# Patient Record
Sex: FEMALE | Race: WHITE | NOT HISPANIC OR LATINO | Employment: FULL TIME | URBAN - METROPOLITAN AREA
[De-identification: names, ages, dates, MRNs, and addresses within clinical notes are randomized per-mention and may not be internally consistent; named-entity substitution may affect disease eponyms.]

---

## 2025-07-06 ENCOUNTER — APPOINTMENT (OUTPATIENT)
Dept: RADIOLOGY | Facility: HOSPITAL | Age: 70
DRG: 435 | End: 2025-07-06
Payer: COMMERCIAL

## 2025-07-06 ENCOUNTER — HOSPITAL ENCOUNTER (INPATIENT)
Facility: HOSPITAL | Age: 70
LOS: 4 days | Discharge: HOME | DRG: 435 | End: 2025-07-10
Attending: STUDENT IN AN ORGANIZED HEALTH CARE EDUCATION/TRAINING PROGRAM | Admitting: INTERNAL MEDICINE
Payer: COMMERCIAL

## 2025-07-06 DIAGNOSIS — I81 PORTAL VEIN THROMBOSIS: Primary | ICD-10-CM

## 2025-07-06 LAB
ALBUMIN SERPL BCP-MCNC: 3.4 G/DL (ref 3.4–5)
ALP SERPL-CCNC: 171 U/L (ref 33–136)
ALT SERPL W P-5'-P-CCNC: 26 U/L (ref 7–45)
ANION GAP BLDV CALCULATED.4IONS-SCNC: 13 MMOL/L (ref 10–25)
ANION GAP SERPL CALC-SCNC: 12 MMOL/L (ref 10–20)
APPEARANCE UR: CLEAR
APTT PPP: 31 SECONDS (ref 26–36)
AST SERPL W P-5'-P-CCNC: 55 U/L (ref 9–39)
BASE EXCESS BLDV CALC-SCNC: -1.8 MMOL/L (ref -2–3)
BASOPHILS # BLD AUTO: 0.02 X10*3/UL (ref 0–0.1)
BASOPHILS NFR BLD AUTO: 0.2 %
BILIRUB SERPL-MCNC: 1.8 MG/DL (ref 0–1.2)
BILIRUB UR STRIP.AUTO-MCNC: NEGATIVE MG/DL
BNP SERPL-MCNC: 55 PG/ML (ref 0–99)
BODY TEMPERATURE: 37 DEGREES CELSIUS
BUN SERPL-MCNC: 12 MG/DL (ref 6–23)
CA-I BLDV-SCNC: 1.15 MMOL/L (ref 1.1–1.33)
CALCIUM SERPL-MCNC: 8.6 MG/DL (ref 8.6–10.3)
CARDIAC TROPONIN I PNL SERPL HS: 5 NG/L (ref 0–13)
CARDIAC TROPONIN I PNL SERPL HS: 5 NG/L (ref 0–13)
CHLORIDE BLDV-SCNC: 97 MMOL/L (ref 98–107)
CHLORIDE SERPL-SCNC: 99 MMOL/L (ref 98–107)
CO2 SERPL-SCNC: 22 MMOL/L (ref 21–32)
COLOR UR: YELLOW
CREAT SERPL-MCNC: 0.52 MG/DL (ref 0.5–1.05)
EGFRCR SERPLBLD CKD-EPI 2021: >90 ML/MIN/1.73M*2
EOSINOPHIL # BLD AUTO: 0.02 X10*3/UL (ref 0–0.7)
EOSINOPHIL NFR BLD AUTO: 0.2 %
ERYTHROCYTE [DISTWIDTH] IN BLOOD BY AUTOMATED COUNT: 15.9 % (ref 11.5–14.5)
GLUCOSE BLDV-MCNC: 100 MG/DL (ref 74–99)
GLUCOSE SERPL-MCNC: 97 MG/DL (ref 74–99)
GLUCOSE UR STRIP.AUTO-MCNC: NORMAL MG/DL
HCO3 BLDV-SCNC: 22.2 MMOL/L (ref 22–26)
HCT VFR BLD AUTO: 42.4 % (ref 36–46)
HCT VFR BLD EST: 44 % (ref 36–46)
HGB BLD-MCNC: 13.6 G/DL (ref 12–16)
HGB BLDV-MCNC: 14.5 G/DL (ref 12–16)
IMM GRANULOCYTES # BLD AUTO: 0.06 X10*3/UL (ref 0–0.7)
IMM GRANULOCYTES NFR BLD AUTO: 0.6 % (ref 0–0.9)
INHALED O2 CONCENTRATION: 21 %
INR PPP: 1.3 (ref 0.9–1.1)
IRON SATN MFR SERPL: 28 % (ref 25–45)
IRON SERPL-MCNC: 62 UG/DL (ref 35–150)
KETONES UR STRIP.AUTO-MCNC: ABNORMAL MG/DL
LACTATE BLDV-SCNC: 1.9 MMOL/L (ref 0.4–2)
LEUKOCYTE ESTERASE UR QL STRIP.AUTO: NEGATIVE
LYMPHOCYTES # BLD AUTO: 0.97 X10*3/UL (ref 1.2–4.8)
LYMPHOCYTES NFR BLD AUTO: 9.6 %
MAGNESIUM SERPL-MCNC: 1.88 MG/DL (ref 1.6–2.4)
MCH RBC QN AUTO: 31.1 PG (ref 26–34)
MCHC RBC AUTO-ENTMCNC: 32.1 G/DL (ref 32–36)
MCV RBC AUTO: 97 FL (ref 80–100)
MONOCYTES # BLD AUTO: 0.87 X10*3/UL (ref 0.1–1)
MONOCYTES NFR BLD AUTO: 8.6 %
NEUTROPHILS # BLD AUTO: 8.14 X10*3/UL (ref 1.2–7.7)
NEUTROPHILS NFR BLD AUTO: 80.8 %
NITRITE UR QL STRIP.AUTO: NEGATIVE
NRBC BLD-RTO: 0 /100 WBCS (ref 0–0)
OXYHGB MFR BLDV: 53.4 % (ref 45–75)
PCO2 BLDV: 35 MM HG (ref 41–51)
PH BLDV: 7.41 PH (ref 7.33–7.43)
PH UR STRIP.AUTO: 6.5 [PH]
PLATELET # BLD AUTO: 121 X10*3/UL (ref 150–450)
PO2 BLDV: 33 MM HG (ref 35–45)
POTASSIUM BLDV-SCNC: 4.1 MMOL/L (ref 3.5–5.3)
POTASSIUM SERPL-SCNC: 4 MMOL/L (ref 3.5–5.3)
PROT SERPL-MCNC: 7.6 G/DL (ref 6.4–8.2)
PROT UR STRIP.AUTO-MCNC: ABNORMAL MG/DL
PROTHROMBIN TIME: 14.5 SECONDS (ref 9.8–12.4)
RBC # BLD AUTO: 4.38 X10*6/UL (ref 4–5.2)
RBC # UR STRIP.AUTO: NEGATIVE MG/DL
RBC #/AREA URNS AUTO: NORMAL /HPF
SAO2 % BLDV: 55 % (ref 45–75)
SODIUM BLDV-SCNC: 128 MMOL/L (ref 136–145)
SODIUM SERPL-SCNC: 129 MMOL/L (ref 136–145)
SP GR UR STRIP.AUTO: >1.05
SQUAMOUS #/AREA URNS AUTO: NORMAL /HPF
TIBC SERPL-MCNC: 220 UG/DL (ref 240–445)
UFH PPP CHRO-ACNC: 0.8 IU/ML (ref ?–1.1)
UIBC SERPL-MCNC: 158 UG/DL (ref 110–370)
UROBILINOGEN UR STRIP.AUTO-MCNC: NORMAL MG/DL
WBC # BLD AUTO: 10.1 X10*3/UL (ref 4.4–11.3)
WBC #/AREA URNS AUTO: NORMAL /HPF

## 2025-07-06 PROCEDURE — 85520 HEPARIN ASSAY: CPT

## 2025-07-06 PROCEDURE — 83735 ASSAY OF MAGNESIUM: CPT

## 2025-07-06 PROCEDURE — 82105 ALPHA-FETOPROTEIN SERUM: CPT | Mod: PARLAB | Performed by: PHYSICIAN ASSISTANT

## 2025-07-06 PROCEDURE — 2550000001 HC RX 255 CONTRASTS: Performed by: STUDENT IN AN ORGANIZED HEALTH CARE EDUCATION/TRAINING PROGRAM

## 2025-07-06 PROCEDURE — 1200000002 HC GENERAL ROOM WITH TELEMETRY DAILY

## 2025-07-06 PROCEDURE — 84466 ASSAY OF TRANSFERRIN: CPT | Mod: PARLAB

## 2025-07-06 PROCEDURE — 83880 ASSAY OF NATRIURETIC PEPTIDE: CPT

## 2025-07-06 PROCEDURE — 96374 THER/PROPH/DIAG INJ IV PUSH: CPT

## 2025-07-06 PROCEDURE — 36415 COLL VENOUS BLD VENIPUNCTURE: CPT | Performed by: STUDENT IN AN ORGANIZED HEALTH CARE EDUCATION/TRAINING PROGRAM

## 2025-07-06 PROCEDURE — 99223 1ST HOSP IP/OBS HIGH 75: CPT

## 2025-07-06 PROCEDURE — 93971 EXTREMITY STUDY: CPT | Performed by: RADIOLOGY

## 2025-07-06 PROCEDURE — 81003 URINALYSIS AUTO W/O SCOPE: CPT

## 2025-07-06 PROCEDURE — 99285 EMERGENCY DEPT VISIT HI MDM: CPT | Mod: 25 | Performed by: STUDENT IN AN ORGANIZED HEALTH CARE EDUCATION/TRAINING PROGRAM

## 2025-07-06 PROCEDURE — 93971 EXTREMITY STUDY: CPT

## 2025-07-06 PROCEDURE — 82728 ASSAY OF FERRITIN: CPT | Mod: PARLAB

## 2025-07-06 PROCEDURE — 74177 CT ABD & PELVIS W/CONTRAST: CPT

## 2025-07-06 PROCEDURE — 84484 ASSAY OF TROPONIN QUANT: CPT

## 2025-07-06 PROCEDURE — 83540 ASSAY OF IRON: CPT

## 2025-07-06 PROCEDURE — 82607 VITAMIN B-12: CPT | Mod: PARLAB

## 2025-07-06 PROCEDURE — 84132 ASSAY OF SERUM POTASSIUM: CPT

## 2025-07-06 PROCEDURE — 82746 ASSAY OF FOLIC ACID SERUM: CPT | Mod: PARLAB

## 2025-07-06 PROCEDURE — 85610 PROTHROMBIN TIME: CPT

## 2025-07-06 PROCEDURE — 2500000004 HC RX 250 GENERAL PHARMACY W/ HCPCS (ALT 636 FOR OP/ED): Mod: JZ

## 2025-07-06 PROCEDURE — 36415 COLL VENOUS BLD VENIPUNCTURE: CPT

## 2025-07-06 PROCEDURE — 71275 CT ANGIOGRAPHY CHEST: CPT

## 2025-07-06 PROCEDURE — 80053 COMPREHEN METABOLIC PANEL: CPT

## 2025-07-06 PROCEDURE — 85025 COMPLETE CBC W/AUTO DIFF WBC: CPT

## 2025-07-06 RX ORDER — HYDROMORPHONE HYDROCHLORIDE 0.2 MG/ML
0.2 INJECTION INTRAMUSCULAR; INTRAVENOUS; SUBCUTANEOUS EVERY 4 HOURS PRN
Status: DISCONTINUED | OUTPATIENT
Start: 2025-07-06 | End: 2025-07-10 | Stop reason: HOSPADM

## 2025-07-06 RX ORDER — POLYETHYLENE GLYCOL 3350 17 G/17G
17 POWDER, FOR SOLUTION ORAL DAILY
Status: DISCONTINUED | OUTPATIENT
Start: 2025-07-07 | End: 2025-07-10 | Stop reason: HOSPADM

## 2025-07-06 RX ORDER — SODIUM CHLORIDE 9 MG/ML
100 INJECTION, SOLUTION INTRAVENOUS CONTINUOUS
Status: DISCONTINUED | OUTPATIENT
Start: 2025-07-06 | End: 2025-07-07

## 2025-07-06 RX ORDER — LISINOPRIL 20 MG/1
20 TABLET ORAL DAILY
Status: DISCONTINUED | OUTPATIENT
Start: 2025-07-07 | End: 2025-07-10 | Stop reason: HOSPADM

## 2025-07-06 RX ORDER — ONDANSETRON HYDROCHLORIDE 2 MG/ML
4 INJECTION, SOLUTION INTRAVENOUS EVERY 8 HOURS PRN
Status: DISCONTINUED | OUTPATIENT
Start: 2025-07-06 | End: 2025-07-08

## 2025-07-06 RX ORDER — FAMOTIDINE 20 MG/1
20 TABLET, FILM COATED ORAL EVERY MORNING
COMMUNITY

## 2025-07-06 RX ORDER — HEPARIN SODIUM 10000 [USP'U]/100ML
0-4500 INJECTION, SOLUTION INTRAVENOUS CONTINUOUS
Status: DISCONTINUED | OUTPATIENT
Start: 2025-07-06 | End: 2025-07-10

## 2025-07-06 RX ORDER — TALC
6 POWDER (GRAM) TOPICAL NIGHTLY PRN
Status: DISCONTINUED | OUTPATIENT
Start: 2025-07-06 | End: 2025-07-10 | Stop reason: HOSPADM

## 2025-07-06 RX ORDER — LISINOPRIL 20 MG/1
20 TABLET ORAL DAILY
COMMUNITY
End: 2025-07-17 | Stop reason: WASHOUT

## 2025-07-06 RX ADMIN — HEPARIN SODIUM 1500 UNITS/HR: 10000 INJECTION, SOLUTION INTRAVENOUS at 17:11

## 2025-07-06 RX ADMIN — IOHEXOL 75 ML: 350 INJECTION, SOLUTION INTRAVENOUS at 13:45

## 2025-07-06 RX ADMIN — HYDROMORPHONE HYDROCHLORIDE 0.5 MG: 1 INJECTION, SOLUTION INTRAMUSCULAR; INTRAVENOUS; SUBCUTANEOUS at 15:49

## 2025-07-06 SDOH — SOCIAL STABILITY: SOCIAL INSECURITY: HAVE YOU HAD THOUGHTS OF HARMING ANYONE ELSE?: NO

## 2025-07-06 SDOH — SOCIAL STABILITY: SOCIAL INSECURITY: ARE YOU OR HAVE YOU BEEN THREATENED OR ABUSED PHYSICALLY, EMOTIONALLY, OR SEXUALLY BY ANYONE?: NO

## 2025-07-06 SDOH — SOCIAL STABILITY: SOCIAL INSECURITY: WITHIN THE LAST YEAR, HAVE YOU BEEN HUMILIATED OR EMOTIONALLY ABUSED IN OTHER WAYS BY YOUR PARTNER OR EX-PARTNER?: NO

## 2025-07-06 SDOH — ECONOMIC STABILITY: INCOME INSECURITY: IN THE PAST 12 MONTHS HAS THE ELECTRIC, GAS, OIL, OR WATER COMPANY THREATENED TO SHUT OFF SERVICES IN YOUR HOME?: NO

## 2025-07-06 SDOH — SOCIAL STABILITY: SOCIAL INSECURITY: HAVE YOU HAD ANY THOUGHTS OF HARMING ANYONE ELSE?: NO

## 2025-07-06 SDOH — ECONOMIC STABILITY: FOOD INSECURITY: WITHIN THE PAST 12 MONTHS, YOU WORRIED THAT YOUR FOOD WOULD RUN OUT BEFORE YOU GOT THE MONEY TO BUY MORE.: NEVER TRUE

## 2025-07-06 SDOH — SOCIAL STABILITY: SOCIAL INSECURITY: HAS ANYONE EVER THREATENED TO HURT YOUR FAMILY OR YOUR PETS?: NO

## 2025-07-06 SDOH — SOCIAL STABILITY: SOCIAL INSECURITY: WITHIN THE LAST YEAR, HAVE YOU BEEN AFRAID OF YOUR PARTNER OR EX-PARTNER?: NO

## 2025-07-06 SDOH — ECONOMIC STABILITY: FOOD INSECURITY: WITHIN THE PAST 12 MONTHS, THE FOOD YOU BOUGHT JUST DIDN'T LAST AND YOU DIDN'T HAVE MONEY TO GET MORE.: NEVER TRUE

## 2025-07-06 SDOH — SOCIAL STABILITY: SOCIAL INSECURITY
WITHIN THE LAST YEAR, HAVE YOU BEEN KICKED, HIT, SLAPPED, OR OTHERWISE PHYSICALLY HURT BY YOUR PARTNER OR EX-PARTNER?: NO

## 2025-07-06 SDOH — SOCIAL STABILITY: SOCIAL INSECURITY
WITHIN THE LAST YEAR, HAVE YOU BEEN RAPED OR FORCED TO HAVE ANY KIND OF SEXUAL ACTIVITY BY YOUR PARTNER OR EX-PARTNER?: NO

## 2025-07-06 SDOH — HEALTH STABILITY: PHYSICAL HEALTH: ON AVERAGE, HOW MANY DAYS PER WEEK DO YOU ENGAGE IN MODERATE TO STRENUOUS EXERCISE (LIKE A BRISK WALK)?: 0 DAYS

## 2025-07-06 SDOH — SOCIAL STABILITY: SOCIAL INSECURITY: DO YOU FEEL ANYONE HAS EXPLOITED OR TAKEN ADVANTAGE OF YOU FINANCIALLY OR OF YOUR PERSONAL PROPERTY?: NO

## 2025-07-06 SDOH — SOCIAL STABILITY: SOCIAL INSECURITY: ABUSE: ADULT

## 2025-07-06 SDOH — HEALTH STABILITY: PHYSICAL HEALTH: ON AVERAGE, HOW MANY MINUTES DO YOU ENGAGE IN EXERCISE AT THIS LEVEL?: 0 MIN

## 2025-07-06 SDOH — SOCIAL STABILITY: SOCIAL INSECURITY: WERE YOU ABLE TO COMPLETE ALL THE BEHAVIORAL HEALTH SCREENINGS?: YES

## 2025-07-06 SDOH — SOCIAL STABILITY: SOCIAL INSECURITY: DOES ANYONE TRY TO KEEP YOU FROM HAVING/CONTACTING OTHER FRIENDS OR DOING THINGS OUTSIDE YOUR HOME?: NO

## 2025-07-06 SDOH — SOCIAL STABILITY: SOCIAL INSECURITY: DO YOU FEEL UNSAFE GOING BACK TO THE PLACE WHERE YOU ARE LIVING?: NO

## 2025-07-06 SDOH — SOCIAL STABILITY: SOCIAL INSECURITY: ARE THERE ANY APPARENT SIGNS OF INJURIES/BEHAVIORS THAT COULD BE RELATED TO ABUSE/NEGLECT?: NO

## 2025-07-06 ASSESSMENT — PAIN - FUNCTIONAL ASSESSMENT
PAIN_FUNCTIONAL_ASSESSMENT: 0-10
PAIN_FUNCTIONAL_ASSESSMENT: 0-10

## 2025-07-06 ASSESSMENT — ACTIVITIES OF DAILY LIVING (ADL)
ASSISTIVE_DEVICE: EYEGLASSES
PATIENT'S MEMORY ADEQUATE TO SAFELY COMPLETE DAILY ACTIVITIES?: YES
JUDGMENT_ADEQUATE_SAFELY_COMPLETE_DAILY_ACTIVITIES: YES
BATHING: INDEPENDENT
HEARING - LEFT EAR: FUNCTIONAL
ADEQUATE_TO_COMPLETE_ADL: YES
WALKS IN HOME: INDEPENDENT
FEEDING YOURSELF: INDEPENDENT
HEARING - RIGHT EAR: FUNCTIONAL
GROOMING: INDEPENDENT
DRESSING YOURSELF: INDEPENDENT
LACK_OF_TRANSPORTATION: NO
TOILETING: INDEPENDENT

## 2025-07-06 ASSESSMENT — LIFESTYLE VARIABLES
SUBSTANCE_ABUSE_PAST_12_MONTHS: NO
AUDIT-C TOTAL SCORE: 1
HOW OFTEN DO YOU HAVE 6 OR MORE DRINKS ON ONE OCCASION: NEVER
SKIP TO QUESTIONS 9-10: 1
PRESCIPTION_ABUSE_PAST_12_MONTHS: NO
HOW OFTEN DO YOU HAVE A DRINK CONTAINING ALCOHOL: MONTHLY OR LESS
HOW MANY STANDARD DRINKS CONTAINING ALCOHOL DO YOU HAVE ON A TYPICAL DAY: 1 OR 2
AUDIT-C TOTAL SCORE: 1

## 2025-07-06 ASSESSMENT — COGNITIVE AND FUNCTIONAL STATUS - GENERAL
DAILY ACTIVITIY SCORE: 24
MOBILITY SCORE: 24
PATIENT BASELINE BEDBOUND: NO

## 2025-07-06 ASSESSMENT — PAIN SCALES - GENERAL
PAINLEVEL_OUTOF10: 0 - NO PAIN
PAINLEVEL_OUTOF10: 0 - NO PAIN

## 2025-07-06 ASSESSMENT — PATIENT HEALTH QUESTIONNAIRE - PHQ9
1. LITTLE INTEREST OR PLEASURE IN DOING THINGS: NOT AT ALL
2. FEELING DOWN, DEPRESSED OR HOPELESS: NOT AT ALL
SUM OF ALL RESPONSES TO PHQ9 QUESTIONS 1 & 2: 0

## 2025-07-06 NOTE — PROGRESS NOTES
Pharmacy Medication History Review    Lore Aldridge is a 69 y.o. female admitted for No Principal Problem: There is no principal problem currently on the Problem List. Please update the Problem List and refresh.. Pharmacy reviewed the patient's [id number] medications and allergies for accuracy.    The list below reflectives the updated PTA list. Please review each medication in order reconciliation for additional clarification and justification.  Prior to Admission medications    Medication Sig Start Date End Date Taking? Authorizing Provider   famotidine (Pepcid) 20 mg tablet Take 1 tablet (20 mg) by mouth once daily in the morning.   Yes Historical Provider, MD   lisinopril 20 mg tablet Take 1 tablet (20 mg) by mouth once daily.   Yes Historical Provider, MD   multivitamin with minerals (HAIR,SKIN AND NAILS ORAL) Take 1 tablet by mouth 3 (three) times a week.   Yes Historical Provider, MD        The list below reflectives the updated allergy list. Please review each documented allergy for additional clarification and justification.  Allergies  Reviewed by Malena Elias on 7/6/2025   No Known Allergies         Below are additional concerns with the patient's PTA list.      Malena Elias

## 2025-07-06 NOTE — ED TRIAGE NOTES
Pt complains abdominal pain  and bloating. States her abd is hard. More pain after meals. +Nausea.  No appetite . Has had for about 1 month.    Also complains of left lower leg swelling.  Denies injury to the area , started last week.

## 2025-07-06 NOTE — ED PROVIDER NOTES
History of Present Illness     History provided by: Patient  Limitations to History: None  External Records Reviewed with Brief Summary: None    HPI:  Lore Aldridge is a 69 y.o. female with HTN presenting for concern of RUQ pain, nausea, and LLE swelling. The abdominal pain has been present for about of month, comes and goes but is typically always located in the RUQ and right side. She additionally has had increasing abdominal distention and discomfort over the last few days and new LLE swelling. Patient had episode of SOB this morning while ambulating in her house, she has not been short of breath like this before. There were no new changes to abdominal pain today, just concern that is has continued and she could not wait until her next outpatient appointment. Patient denies recent long travel, taking estrogen containing products, active cancer treatment. She denies CP, dysuria, fever, cough.       Physical Exam   Triage vitals:  T 36.5 °C (97.7 °F)  HR (!) 116  /86  RR 16  O2 96 % None (Room air)    Physical Exam  Constitutional:       General: She is not in acute distress.     Appearance: She is not toxic-appearing.   HENT:      Head: Normocephalic and atraumatic.      Mouth/Throat:      Mouth: Mucous membranes are moist.      Pharynx: Oropharynx is clear.   Eyes:      Extraocular Movements: Extraocular movements intact.      Conjunctiva/sclera: Conjunctivae normal.   Cardiovascular:      Rate and Rhythm: Regular rhythm. Tachycardia present.      Pulses: Normal pulses.      Comments: 2+  radial and DP pulses bilaterally.   Pulmonary:      Effort: Pulmonary effort is normal. No respiratory distress.      Breath sounds: Normal breath sounds. No stridor. No wheezing.   Abdominal:      General: There is distension.      Palpations: Abdomen is soft.      Tenderness: There is abdominal tenderness (RUQ). There is no guarding or rebound.   Musculoskeletal:         General: Normal range of motion.       Cervical back: Normal range of motion.      Left lower leg: Edema (1+ pitting edema to knee) present.      Comments: No erythema or calf tenderness of LLE.    Skin:     General: Skin is warm and dry.      Capillary Refill: Capillary refill takes less than 2 seconds.      Coloration: Skin is not jaundiced.      Findings: No bruising.   Neurological:      General: No focal deficit present.      Mental Status: She is alert and oriented to person, place, and time. Mental status is at baseline.      Cranial Nerves: No cranial nerve deficit.      Sensory: No sensory deficit.   Psychiatric:         Mood and Affect: Mood normal.         Behavior: Behavior normal.         Thought Content: Thought content normal.           Medical Decision Making & ED Course   Medical Decision Makin y.o. female with HTN presenting for concern of RUQ pain, nausea, and LLE swelling. Patient is tachycardic but satting well on room air. Her physical exam is notable for RUQ abdominal tenderness, abdominal distention, and LLE edema. Differential diagnosis includes but is not limited to gallbladder pathology, colitis, gastroenteritis, UTI, nephrolithiasis, DVT, PE, intraabdominal mass, ascites. Labs including CBC, CMP, troponin, BNP, VBG obtained. Imaging of LLE, CT PE, and CT AP ordered to further evaluate. Patient given liter fluids given tachycardia and reported decreased oral intake. EKG reviewed, non ischemic.       CT imaging concerning for metastasis or primary oncologic source. She is noted to have ascites and portal vein thrombosis. No DVT on US. LE swelling likely secondary to the portal venous thrombosis and subsequent backing up of fluid.     Patient was informed of these findings with attending and her  at bedside. Her questions were answered. Given the portal venous thrombosis, patient will require admission for anticoagulation in addition to oncology evaluation. She was initiated on high intensity heparin drip. Patient  discussed with hospitalist who accepted the patient. She was also starting to have increased pain at this time and given IV pain control.         ED Course:  ED Course as of 07/07/25 2206   Sun Jul 06, 2025   1318 MAGNESIUM: 1.88 [AC]   1318 CBC and Auto Differential(!)  No leukocytosis, hemoglobin stable at 13.6.  Platelets mildly decreased at 121. [AC]   1318 Platelets(!): 121  She has had thrombocytopenia on past labs. [AC]   1319 Comprehensive metabolic panel(!)  Sodium decreased at 129.  Patient has endorsed decreased oral intake.  Kidney function within normal limits. Alk phos and bilirubin both elevated.  AST elevated at 55 but consistent with labs 2 years ago. [AC]   1333 Comprehensive metabolic panel(!) [AC]   1336 Lower extremity venous duplex left  IMPRESSION:  No sonographic evidence for deep vein thrombosis within the evaluated  veins.       [AC]   1336 BNP: 55  Within normal limits.  [AC]   1439 IMPRESSION:  CHEST:  1.  Multiple pulmonary nodules consistent with metastatic disease.  2. Subcarinal adenopathy. Prominent right paratracheal and right  hilar lymph nodes.      ABDOMEN AND PELVIS:  1.  Nodularity of the contour of the liver with hypertrophy of the  left lobe consistent with hepatocellular disease and cirrhosis.  2. Multiple hypodense and hyperdense masses in the liver consistent  with metastatic disease or multicentric hepatocellular carcinoma.  3. Enlarged spleen.  4. Portal vein thrombosis.  5. Gastrohepatic, peripancreatic, subhash hepatis and retroperitoneal  adenopathy. Some of the lymph nodes are necrotic.  6. Large amount of ascites. Possible anasarca/hypoproteinemia. Subtle  nodularity of the peritoneal lining consistent with tumoral studding.  7. 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  8. 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  9. Sigmoid diverticula with no diverticulitis.  10. Cholelithiasis.   [AC]   1500 CT PE    IMPRESSION:  CHEST:  1.  Multiple pulmonary nodules  consistent with metastatic disease.  2. Subcarinal adenopathy. Prominent right paratracheal and right  hilar lymph nodes.   [AC]      ED Course User Index  [AC] Harsha Cosme DO         Diagnoses as of 07/07/25 2206   Portal vein thrombosis         The patient was discussed with the following consultants/services: Hospitalist/Admitting Provider who accepted the patient for admission  ----         Independent Result Review and Interpretation: Relevant laboratory and radiographic results were reviewed and independently interpreted by myself.  As necessary, they are commented on in the ED Course.    Chronic conditions affecting the patient's care: As documented above in MDM    Care Considerations: As documented above in MDM    Disposition   As a result of their workup, the patient will require admission to the hospital.  The patient was informed of her diagnosis.  The patient was given the opportunity to ask questions and I answered them. The patient agreed to be admitted to the hospital.    Procedures   Procedures    Patient seen and discussed with ED attending physician.    Harsha Cosme DO  Emergency Medicine PGY-2     Harsha Cosme DO  Resident  07/07/25 2206

## 2025-07-06 NOTE — Clinical Note
1400cc ascites fluid noted 
2 core liver samples taken at this time placed in formulin. Gel foam placed biopsy needle removed .
3900 ml drained yellow ascites. Sent to lab for testing, drain removed site covered with occlusive dressing pt vss
Abdominal ascites fluid noted para catheter inserted at this time per Dr Cummings. Draining fidel ascites fluid
Needle iserted, yellow drainage noted
Occlusive drsg 
Occlusive drsg placed right abdomen no bleeding noted
Patient made aware to report her floor nurse any sxs of abdominal pain . Floor RN called made aware of strict bedrest, s/sxs of bleeding , abdominal pain . Floor RN aware to resume heparin gtt 7/10/25 at 0600.  Specimen brought to lab per writer.
Prepped: right abdomen. Prepped with: chlorhexidine. The patient was draped.
Tech betsy scanning
The site was marked. Prepped: right abdomen. Prepped with: ChloraPrep. The patient was draped.
CAREY ARCEO  Pediatrics  [facility] [place] [address]  Phone: [phone]  Fax: ()-  Follow Up Time: 1-3 Days

## 2025-07-06 NOTE — H&P
Lore Aldridge is a 69 y.o. female with a history of HTN, Tobacco use, Hx of alcohol abuse presents with abdominal pain and nausea.     Subjective/History     Lore Aldridge is a 69 y.o. female with a history of HTN, Tobacco use, Hx of alcohol abuse presents with abdominal pain and nausea. Patient says about two months ago she started having intermittent episodes of unprovoked nausea with dry heaving. Symptoms improved after she started taking Zantac daily but appetite started to progressively worsen. Over the past month she noticed that she was losing weight but over the last few weeks she noticed that her abdomen was becoming more distended. Last week she started to experiencing unprovoked non-radiating intermittent sharp pain localized to the RUQ of her abdomen. She now describes pain as a constant burning sensation. Last week she also noticed left lower extremity edema. Patient says appetite remains poor, she has lost weight but she is unsure of how much. Patient has never experienced these symptoms collectively prior to two months ago. Denies fevers, chills, SOB, palpitations, chest pain, melena/hematochezia. Admits to long history of alcohol use. Quit drinking alcohol 3 months.     ED Course:   In the ED patient was tachycardic otherwise stable. Labs significant for Na 129, Alk phos 171, AST 55, Bili 1.8, INR 1.3, Platelet 121. CT A/P 7/6 showed nodularity of liver with hypertrophy of the left lobe consistent with hepatocellular disease and cirrhosis. Masses in the liver consistent with metastatic disease vs HCC. Portal vein thrombosis. Diffuse adenopathy. Necrotic right adrenal metastasis. CTA chest showed multiple pulmonary nodules consistent with metastatic disease. Subcarinal adenopathy. Prominent right paratracheal and right hilar lymph nodes. Left lower extremity ultrasound showed no evidence of DVT. Patient was started on heparin ggt.     Surgical history: Left reverse total shoulder replacement  "(2022)  Family history: Mother with history of lung cancer (passed away at 79), Father with dementia and unknown cardiac issues.   Risk/Social factors: Current smoker. Smoking about 3 cigarettes daily for the past month. Patient has estimated 25 pack year history of smoking cigarettes. Patient says she quit alcohol 3 months ago. She admits to being a heavy alcohol user in the past. Was drinking about a 6 pack of beers almost daily for about 50 years. Denies illicit drug use      Objective     Physical Exam:  General: ill-appearing, alert, cooperative  HEENT:  Normocephalic, atraumatic  Chest:  Clear to auscultation bilaterally. No wheezes, rales, or rhonchi.  CV:  Tachycardic and regular rhythm. No murmurs appreciated  Abdomen: Abdomen is hard, non-tender, distended. BS +   Extremities: left lower extremity edema   Neurological:  AAOx3. No focal deficits.  Skin:  Warm and dry.    Last Recorded Vitals  Blood pressure 139/63, pulse (!) 107, temperature 36.5 °C (97.7 °F), resp. rate 16, height (!) 1.549 m (5' 1\"), weight 80.1 kg (176 lb 9.4 oz), SpO2 94%.  Intake/Output last 3 Shifts:  No intake/output data recorded.    Last CBC & BMP  Lab Results   Component Value Date    GLUCOSE 97 07/06/2025    CALCIUM 8.6 07/06/2025     (L) 07/06/2025    K 4.0 07/06/2025    CO2 22 07/06/2025    CL 99 07/06/2025    BUN 12 07/06/2025    CREATININE 0.52 07/06/2025     Lab Results   Component Value Date    WBC 10.1 07/06/2025    HGB 13.6 07/06/2025    HCT 42.4 07/06/2025    MCV 97 07/06/2025     (L) 07/06/2025         Assessment / Plan        Lore Aldridge is a 69 y.o. female with a history of HTN, Tobacco use, Hx of alcohol abuse presents with abdominal pain and nausea.     Acute Medical conditions     # Hepatocellular disease     # Liver masses consistent with metastatic disease vs HCC     # Cirrhosis   # Portal vein thrombosis   # Cholelithiasis   # Splenomegaly  # Anasarca/hypoproteinemia   # Hyperbilirubinemia   # " Left benign renal angiomyolipoma (1.7 cm)  # Sigmoid diverticula with no diverticulitis   # Hx Alcohol Abuse  - CT A/P 7/6: Nodularity of liver with hypertrophy of the left lobe consistent with hepatocellular disease and cirrhosis. Masses in the liver consistent with metastatic disease vs HCC. Portal vein thrombosis. Diffuse adenopathy. Necrotic right adrenal metastasis   Plan:  - Ordered Iron studies, B12, Folate   - Continue heparin ggt (7/6-)  - PRN Dilaudid for pain control   - PRN Zofran for nausea  - Consult IR for diagnostic/therapeutic paracentesis   - Consult GI, appreciate recommendations  - Consult Oncology, appreciate recommendations     # Multiple pulmonary nodules   - CTA chest 7/6: Multiple pulmonary nodules consistent with metastatic disease. Subcarinal adenopathy. Prominent right paratracheal and right hilar lymph nodes.    # Hyponatremia   - Suspected to be secondary to poor oral intake   - Started on maintenance fluids   - Monitor CMP    Chronic Medical conditions  # HTN - Holding home lisinopril as it can contribute to hyponatremia.  # Tobacco use     DVT: heparin ggt  IVF: 100 ml/hr NS  Diet: Regular  Code: DNR/DNI  Consults: GI, Oncology        Jasbir Marin, DO   PGY-2, Internal Medicine  This is a preliminary note, please await attending attestation for final A/P

## 2025-07-07 LAB
AFP SERPL-MCNC: 6 NG/ML (ref 0–9)
ALBUMIN SERPL BCP-MCNC: 3 G/DL (ref 3.4–5)
ALP SERPL-CCNC: 145 U/L (ref 33–136)
ALT SERPL W P-5'-P-CCNC: 24 U/L (ref 7–45)
ANION GAP SERPL CALC-SCNC: 13 MMOL/L (ref 10–20)
AST SERPL W P-5'-P-CCNC: 54 U/L (ref 9–39)
BILIRUB SERPL-MCNC: 1.8 MG/DL (ref 0–1.2)
BUN SERPL-MCNC: 12 MG/DL (ref 6–23)
CALCIUM SERPL-MCNC: 8.2 MG/DL (ref 8.6–10.3)
CHLORIDE SERPL-SCNC: 99 MMOL/L (ref 98–107)
CO2 SERPL-SCNC: 23 MMOL/L (ref 21–32)
CREAT SERPL-MCNC: 0.55 MG/DL (ref 0.5–1.05)
EGFRCR SERPLBLD CKD-EPI 2021: >90 ML/MIN/1.73M*2
ERYTHROCYTE [DISTWIDTH] IN BLOOD BY AUTOMATED COUNT: 15.9 % (ref 11.5–14.5)
FERRITIN SERPL-MCNC: 879 NG/ML (ref 8–150)
FOLATE SERPL-MCNC: 7.4 NG/ML
GLUCOSE SERPL-MCNC: 84 MG/DL (ref 74–99)
HCT VFR BLD AUTO: 38.5 % (ref 36–46)
HGB BLD-MCNC: 12.4 G/DL (ref 12–16)
HOLD SPECIMEN: NORMAL
MCH RBC QN AUTO: 31.2 PG (ref 26–34)
MCHC RBC AUTO-ENTMCNC: 32.2 G/DL (ref 32–36)
MCV RBC AUTO: 97 FL (ref 80–100)
NRBC BLD-RTO: 0 /100 WBCS (ref 0–0)
PLATELET # BLD AUTO: 121 X10*3/UL (ref 150–450)
POTASSIUM SERPL-SCNC: 3.9 MMOL/L (ref 3.5–5.3)
PROT SERPL-MCNC: 6.8 G/DL (ref 6.4–8.2)
RBC # BLD AUTO: 3.98 X10*6/UL (ref 4–5.2)
SODIUM SERPL-SCNC: 131 MMOL/L (ref 136–145)
TRANSFERRIN SERPL-MCNC: 147 MG/DL (ref 200–360)
UFH PPP CHRO-ACNC: 0.5 IU/ML (ref ?–1.1)
UFH PPP CHRO-ACNC: 0.5 IU/ML (ref ?–1.1)
VIT B12 SERPL-MCNC: >2000 PG/ML (ref 211–911)
WBC # BLD AUTO: 7.2 X10*3/UL (ref 4.4–11.3)

## 2025-07-07 PROCEDURE — 2500000004 HC RX 250 GENERAL PHARMACY W/ HCPCS (ALT 636 FOR OP/ED)

## 2025-07-07 PROCEDURE — 97161 PT EVAL LOW COMPLEX 20 MIN: CPT | Mod: GP

## 2025-07-07 PROCEDURE — 99223 1ST HOSP IP/OBS HIGH 75: CPT | Performed by: INTERNAL MEDICINE

## 2025-07-07 PROCEDURE — 97165 OT EVAL LOW COMPLEX 30 MIN: CPT | Mod: GO

## 2025-07-07 PROCEDURE — 36415 COLL VENOUS BLD VENIPUNCTURE: CPT

## 2025-07-07 PROCEDURE — 85027 COMPLETE CBC AUTOMATED: CPT

## 2025-07-07 PROCEDURE — 1200000002 HC GENERAL ROOM WITH TELEMETRY DAILY

## 2025-07-07 PROCEDURE — 99232 SBSQ HOSP IP/OBS MODERATE 35: CPT | Performed by: INTERNAL MEDICINE

## 2025-07-07 PROCEDURE — 84075 ASSAY ALKALINE PHOSPHATASE: CPT

## 2025-07-07 PROCEDURE — 85520 HEPARIN ASSAY: CPT

## 2025-07-07 PROCEDURE — 99223 1ST HOSP IP/OBS HIGH 75: CPT

## 2025-07-07 RX ORDER — SODIUM CHLORIDE 9 MG/ML
100 INJECTION, SOLUTION INTRAVENOUS CONTINUOUS
Status: ACTIVE | OUTPATIENT
Start: 2025-07-07 | End: 2025-07-08

## 2025-07-07 RX ADMIN — HYDROMORPHONE HYDROCHLORIDE 0.4 MG: 1 INJECTION, SOLUTION INTRAMUSCULAR; INTRAVENOUS; SUBCUTANEOUS at 11:51

## 2025-07-07 RX ADMIN — HEPARIN SODIUM 1300 UNITS/HR: 10000 INJECTION, SOLUTION INTRAVENOUS at 08:22

## 2025-07-07 RX ADMIN — HYDROMORPHONE HYDROCHLORIDE 0.2 MG: 0.2 INJECTION, SOLUTION INTRAMUSCULAR; INTRAVENOUS; SUBCUTANEOUS at 00:05

## 2025-07-07 RX ADMIN — SODIUM CHLORIDE 100 ML/HR: 0.9 INJECTION, SOLUTION INTRAVENOUS at 06:32

## 2025-07-07 RX ADMIN — SODIUM CHLORIDE 100 ML/HR: 0.9 INJECTION, SOLUTION INTRAVENOUS at 15:55

## 2025-07-07 ASSESSMENT — COGNITIVE AND FUNCTIONAL STATUS - GENERAL
MOBILITY SCORE: 24
MOBILITY SCORE: 24
DRESSING REGULAR LOWER BODY CLOTHING: A LITTLE
DAILY ACTIVITIY SCORE: 23
DAILY ACTIVITIY SCORE: 24

## 2025-07-07 ASSESSMENT — PAIN - FUNCTIONAL ASSESSMENT
PAIN_FUNCTIONAL_ASSESSMENT: 0-10

## 2025-07-07 ASSESSMENT — ENCOUNTER SYMPTOMS
APPETITE CHANGE: 1
CONFUSION: 0
WHEEZING: 0
UNEXPECTED WEIGHT CHANGE: 1
EYE PAIN: 0
NUMBNESS: 0
SORE THROAT: 0
DYSURIA: 0
HEADACHES: 0
TROUBLE SWALLOWING: 0
MYALGIAS: 0
JOINT SWELLING: 0
SHORTNESS OF BREATH: 1
WEAKNESS: 0
COUGH: 0
HEMATURIA: 1
ARTHRALGIAS: 0
CHILLS: 0
FEVER: 0
DIZZINESS: 0

## 2025-07-07 ASSESSMENT — PAIN DESCRIPTION - LOCATION
LOCATION: ABDOMEN
LOCATION: OTHER (COMMENT)

## 2025-07-07 ASSESSMENT — PAIN SCALES - GENERAL
PAINLEVEL_OUTOF10: 0 - NO PAIN
PAINLEVEL_OUTOF10: 6
PAINLEVEL_OUTOF10: 7
PAINLEVEL_OUTOF10: 0 - NO PAIN

## 2025-07-07 ASSESSMENT — PAIN DESCRIPTION - ORIENTATION: ORIENTATION: RIGHT

## 2025-07-07 ASSESSMENT — PAIN DESCRIPTION - DESCRIPTORS: DESCRIPTORS: ACHING

## 2025-07-07 NOTE — CONSULTS
Consults    Reason For Consult  Concern for metastatic malignancy of the liver    History Of Present Illness  Lore Aldridge is a 69 y.o. female with a history of hypertension, tobacco abuse, alcohol abuse who presented to [facility] complaining of nausea, loss of appetite and right upper quadrant pain.  Workup in the emergency department revealed multiple liver masses consistent with metastatic disease, cirrhosis, portal vein thrombosis, splenomegaly.  CT of the abdomen pelvis revealed nodularity of liver with hypertrophy of the left lobe consistent with hepatocellular disease and cirrhosis.  There was also diffuse adenopathy noted and a necrotic right adrenal metastasis.  Patient states that she has been slowly losing her appetite over the past month and over the past week she has had consistently worsening right upper quadrant pain along with nausea.  She also mentioned that her abdomen has become quite distended and she feels a general discomfort.     Past Medical History  As mentioned above    Surgical History  She has no past surgical history on file.     Social History  Current smoker with a 25-pack-year history, reports that she quit drinking alcohol approximately 3 months ago but prior to that she was drinking around a sixpack of beer daily for around 50 years.    Family History  Family History[1]     Allergies  Patient has no known allergies.    Review of Systems  Review of systems negative unless mentioned above     Physical Exam  Constitutional: NAD  ENMT: mucous membranes moist, EOMI, conjunctivae clear  Head/Neck: normocephalic, atraumatic; supple, trachea midline  Respiratory/Thorax: patent airways, CTAB; no wheezes, rales, or rhonchi  Cardiovascular: RRR, no murmur  Gastrointestinal: Abdomen firm and distended  Extremities: palpable peripheral pulses, no edema or cyanosis  Neurological: AO x3, no focal deficits  Psychological: appropriate mood and behavior  Skin: warm and dry    Last  Recorded Vitals  /63   Pulse 94   Temp 36.6 °C (97.9 °F)   Resp 16   Wt 80.1 kg (176 lb 9.4 oz)   SpO2 93%     Relevant Results       Assessment/Plan   Lore Aldridge is a 69 y.o. female with a history of hypertension, tobacco abuse, alcohol abuse who presented to [facility] complaining of nausea, loss of appetite and right upper quadrant pain.      #Multiple liver masses, concern for metastatic disease  #Multiple pulmonary nodules with subcarinal adenopathy  #Cirrhosis  #Portal vein thrombosis  #Splenomegaly  #Anasarca  #Hyperbilirubinemia  #Renal angiomyolipoma  -Agree with continuing heparin drip for the patient's portal vein thrombosis.  I believe that this is less likely hepatocellular carcinoma and more likely a metastatic process as hepatocellular carcinoma usually presents with 1 large mass.  In this patient's case she has multiple liver masses and along with the multiple pulmonary nodules this provides further evidence that this is a metastatic process.  At this time I recommended tissue biopsy of either her liver, a lymph node or her lung.  Patient is to undergo paracentesis during this stay.  An AFP is pending.  She will need follow-up with hematology/oncology upon discharge.    This is a preliminary note written by the resident. Please wait for attending addendum for finalization of note and recommendations.    Dr. Burt Roach  Internal Medicine         [1] No family history on file.

## 2025-07-07 NOTE — PROGRESS NOTES
Assessment / Plan       Lore Aldridge is a 69 y.o. female with a history of HTN, Tobacco use, Hx of alcohol abuse presents with abdominal pain and nausea.      Acute Medical conditions     # Hepatocellular disease     # Liver masses consistent with metastatic disease vs HCC     # Cirrhosis   # Portal vein thrombosis   # Cholelithiasis   # Splenomegaly  # Anasarca/hypoproteinemia   # Hyperbilirubinemia   # Left benign renal angiomyolipoma (1.7 cm)  # Sigmoid diverticula with no diverticulitis   # Hx Alcohol Abuse   - CT A/P 7/6: Nodularity of liver with hypertrophy of the left lobe consistent with hepatocellular disease and cirrhosis. Masses in the liver consistent with metastatic disease vs HCC. Portal vein thrombosis. Diffuse adenopathy. Necrotic right adrenal metastasis.   - AFP tumor marker pending   Plan:  - Continue heparin ggt (7/6-)  - PRN Dilaudid for pain control   - PRN Zofran for nausea  - Consult IR for diagnostic/therapeutic paracentesis. Labs for fluid analysis ordered.   - Consult GI, appreciate recommendations  - Consult Oncology, appreciate recommendations      # Multiple pulmonary nodules   # Tobacco use   - Estimated 25 pack year history of smoking cigarettes.   - CTA chest 7/6: Multiple pulmonary nodules consistent with metastatic disease. Subcarinal adenopathy. Prominent right paratracheal and right hilar lymph nodes.     # Hyponatremia - improving  - Suspected to be secondary to poor oral intake   - Continue maintenance fluids   - Monitor CMP    # Thrombocytopenia   - Appears chronic based on past labs  - Monitor CBC     Chronic Medical conditions  # HTN - Holding home lisinopril as it can contribute to hyponatremia.     DVT: heparin ggt  IVF: 100 ml/hr NS  Diet: Regular, low salt   Code: DNR/DNI  Consults: GI, Oncology        Subjective     No acute overnight events. Patient was seen with  at bedside. She endorses some mild improvement of burning sensation at RUQ. Appetite remains  "poor. Denies fevers, chills, nausea/vomiting.     Objective     Physical Exam:  General: ill-appearing, alert, cooperative  HEENT:  Normocephalic, atraumatic  Chest:  Clear to auscultation bilaterally. No wheezes, rales, or rhonchi.  CV:  Tachycardic and regular rhythm. No murmurs appreciated  Abdomen: Abdomen is hard, non-tender, distended. BS +   Extremities: left lower extremity edema   Neurological:  AAOx3. No focal deficits.  Skin:  Warm and dry.    Last Recorded Vitals  Blood pressure 130/63, pulse 89, temperature 36.4 °C (97.5 °F), temperature source Temporal, resp. rate 16, height 1.6 m (5' 2.99\"), weight 80.1 kg (176 lb 9.4 oz), SpO2 95%.  Intake/Output last 3 Shifts:  No intake/output data recorded.    Last CBC & BMP  Lab Results   Component Value Date    GLUCOSE 84 07/07/2025    CALCIUM 8.2 (L) 07/07/2025     (L) 07/07/2025    K 3.9 07/07/2025    CO2 23 07/07/2025    CL 99 07/07/2025    BUN 12 07/07/2025    CREATININE 0.55 07/07/2025     Lab Results   Component Value Date    WBC 7.2 07/07/2025    HGB 12.4 07/07/2025    HCT 38.5 07/07/2025    MCV 97 07/07/2025     (L) 07/07/2025     Jasbir Marin DO   PGY-2, Internal Medicine  This is a preliminary note, please await attending attestation for final A/P  "

## 2025-07-07 NOTE — PROGRESS NOTES
Occupational Therapy    Evaluation    Patient Name: Lore Aldridge  MRN: [id number]  Today's Date: 7/7/2025  Time Calculation  Start Time: 1157  Stop Time: 1225  Time Calculation (min): 28 min  620/620-A    Assessment  IP OT Assessment  OT Assessment: This hospitalization 7/6/2025: presents with abdominal pain and nausea. Patient says about two months ago she started having intermittent episodes of unprovoked nausea with dry heaving.  Medical plan:  scheduled for paracentesis.  Patient seen this date for initial OT eval.  Plan: Discharge patient from OT since at/close to baseline function in ADL's and functional transfers/mobility.  End of Session Communication: Bedside nurse  End of Session Patient Position: Up in chair, Alarm off, not on at start of session; call-light within reach    Plan:  No Skilled OT: At baseline function  OT Discharge Recommendations: No further acute OT, No OT needed after discharge  OT - OK to Discharge: Yes to next level of care when medically cleared by physician/medical team    Subjective   Current Problem:  1. Portal vein thrombosis          General:  General  Reason for Referral: ADL, safety assessment, functional cog. eval.  Referred By: Mykel Leggett DO  Past Medical History Relevant to Rehab: HTN, Tobacco use, Hx of alcohol abuse  Co-Treatment: PT  Co-Treatment Reason: Co-eval to maximize safety during mobility tasks  Prior to Session Communication: Bedside nurse who confirmed that patient is medically stable to participate in this OT session  Patient Position Received: Bed, 2 rail up, Alarm off, not on at start of session  General Comment: Patient seen bedside; cooperative, motivated, pleasant    Precautions:  Medical Precautions: Fall precautions  Precautions Comment: UE IV    Pain:  Pain Assessment  Pain Assessment: 0-10  0-10 (Numeric) Pain Score: 0 - No pain    Objective   Cognition:  Overall Cognitive Status: Within Functional Limits     Home Living:  Type of Home:  House  Lives With: Spouse  Home Layout: One level (ranch)  Bathroom Shower/Tub: Tub/shower unit  Bathroom Equipment: Grab bars in shower, Hand-held shower hose (bath seat)  Home Living Comments: sleeps in adjustable bed     Prior Function:  Level of [place]: Independent with ADLs and functional transfers  Homemaking Assistance:  (shares with spouse)  Ambulatory Assistance: Independent  Prior Function Comments: drives, shops    ADL:  ADL Comments: at baseline function for ADL's except required assist for left sock due to swelling.  Per patient,  can assist as needed upon return home    Bed Mobility/Transfers:   Bed Mobility  Bed Mobility: Yes  Bed Mobility 1  Bed Mobility 1: Supine to sitting, Sitting to supine  Level of Assistance 1: Independent  Bed Mobility Comments 1: HOB elevated to sit EOB  Transfers  Transfer: Yes  Transfer 1  Transfer From 1: Sit to, Stand to  Transfer to 1: Bed  Transfer Level of Assistance 1: Independent    Ambulation/Gait Training:  Functional Mobility  Functional Mobility Performed: Yes  Functional Mobility 1  Device 1: No device  Assistance 1: Independent    Sitting Balance:  Static Sitting Balance  Static Sitting-Level of Assistance: Independent    Standing Balance:  Static Standing Balance  Static Standing-Level of Assistance: Independent  Dynamic Standing Balance  Dynamic Standing-Level of Assistance: Independent  Dynamic Standing-Comments: to brush teeth in front of sink    Sensation:  Light Touch: No apparent deficits    Extremities: RUE   RUE : Within Functional Limits (grossly observed during functional tasks) and LUE   LUE: Within Functional Limits    Outcome Measures: [facility] Daily Activity  Putting on and taking off regular lower body clothing: A little  Bathing (including washing, rinsing, drying): None  Putting on and taking off regular upper body clothing: None  Toileting, which includes using toilet, bedpan or urinal: None  Taking care of personal grooming such as  brushing teeth: None  Eating Meals: None  Daily Activity - Total Score: 23

## 2025-07-07 NOTE — CONSULTS
Department of Internal Medicine  Gastroenterology  Consult note      Reason for Consult: Image findngs with changes consistent with hepatocellular disease and cirrhosis. Masses in liver consistent with mets vs HCC.    Chief Complaint: Abdominal pain and bloating    History Obtained from: chart review and patient reports, patient's     History of Present Illness      The patient is a 69 y.o. female with signficant past medical history of hypertension, tobacco use, and alcohol abuse who presents for abdominal pain and bloating.    He endorses right upper quadrant abdominal pain that started about a week ago.  She describes it as a burning stabbing sensation that is intermittent.  It radiates to the center of her abdomen.  Sometimes moving can help her pain but having pressure on it such as laying on that side makes it worse.  It is associated with bloating and nausea/acid reflux.  She denies any vomiting, odynophagia, dysphagia, constipation, diarrhea, melena, or hematochezia.  She typically moves her bowels 1-2 times a day anywhere from formed to loose in caliber but normal in color.    She endorses use of tobacco about 4 cigarettes a day but has been decreasing.  She has a history of significant alcohol use drinking 6 beers a day on and off for about the last 20 years.  She denies ever going through alcohol withdrawal.  She states she stopped her do not 3 months ago.  She uses marijuana weekly.  Denies use of NSAIDs or Tylenol.    No history of abdominal surgeries.  She never had a colonoscopy or an EGD.    Allergies  Patient has no known allergies.    Current Medication  Current Medications[1]    Past Medical History  Active Ambulatory Problems     Diagnosis Date Noted    No Active Ambulatory Problems     Resolved Ambulatory Problems     Diagnosis Date Noted    No Resolved Ambulatory Problems     No Additional Past Medical History       Past Surgical History  Surgical History[2]    Family History  Family  "History[3]  No family history of stomach or colon cancer    Social History  TOBACCO:  reports that she has quit smoking. Her smoking use included cigarettes. She has never used smokeless tobacco.  ETOH:  reports that she does not currently use alcohol.  DRUGS:  has no history on file for drug use.  MARITAL STATUS:   OCCUPATION:    Review of Systems  Review of Systems   Constitutional:  Positive for appetite change and unexpected weight change. Negative for chills and fever.   HENT:  Negative for mouth sores, sore throat and trouble swallowing.    Eyes:  Negative for pain and visual disturbance.   Respiratory:  Positive for shortness of breath (RODRIGUEZ). Negative for cough and wheezing.    Cardiovascular:  Negative for chest pain.   Genitourinary:  Positive for hematuria. Negative for decreased urine volume and dysuria.   Musculoskeletal:  Negative for arthralgias, joint swelling and myalgias.   Skin:  Negative for pallor and rash.   Neurological:  Negative for dizziness, weakness, numbness and headaches.   Psychiatric/Behavioral:  Negative for confusion.        PHYSICAL EXAM  VS: /63   Pulse 89   Temp 36.4 °C (97.5 °F) (Temporal)   Resp 16   Ht 1.6 m (5' 2.99\")   Wt 80.1 kg (176 lb 9.4 oz)   SpO2 95%   BMI 31.29 kg/m²  Body mass index is 31.29 kg/m².  Physical Exam  Constitutional:       General: She is not in acute distress.     Appearance: Normal appearance.   HENT:      Head: Normocephalic and atraumatic.      Mouth/Throat:      Mouth: Mucous membranes are moist.      Pharynx: Oropharynx is clear.   Eyes:      General: No scleral icterus.     Extraocular Movements: Extraocular movements intact.      Conjunctiva/sclera: Conjunctivae normal.      Pupils: Pupils are equal, round, and reactive to light.   Cardiovascular:      Rate and Rhythm: Normal rate and regular rhythm.      Heart sounds: No murmur heard.  Pulmonary:      Effort: Pulmonary effort is normal.      Breath sounds: No wheezing or rhonchi. "   Abdominal:      General: Bowel sounds are normal. There is distension.      Palpations: Abdomen is soft. There is no mass.      Tenderness: There is abdominal tenderness (Right upper quadrant tenderness to palpation).      Hernia: No hernia is present.   Musculoskeletal:         General: No swelling or deformity.   Skin:     General: Skin is warm.      Coloration: Skin is not jaundiced.   Neurological:      General: No focal deficit present.      Mental Status: She is alert and oriented to person, place, and time.      Comments: No asterixis on exam   Psychiatric:         Mood and Affect: Mood normal.          DATA  Recent blood work and relevant radiology studies were reviewed and discussed with the patient   Results from last 7 days   Lab Units 07/07/25  0717   WBC AUTO x10*3/uL 7.2   RBC AUTO x10*6/uL 3.98*   HEMOGLOBIN g/dL 12.4   HEMATOCRIT % 38.5   MCV fL 97   MCHC g/dL 32.2   RDW % 15.9*   PLATELETS AUTO x10*3/uL 121*       Results from last 72 hours   Lab Units 07/07/25  0717   SODIUM mmol/L 131*   POTASSIUM mmol/L 3.9   CHLORIDE mmol/L 99   CO2 mmol/L 23   BUN mg/dL 12   CREATININE mg/dL 0.55   CALCIUM mg/dL 8.2*   PROTEIN TOTAL g/dL 6.8   BILIRUBIN TOTAL mg/dL 1.8*   ALK PHOS U/L 145*   AST U/L 54*   ALT U/L 24       Results from last 72 hours   Lab Units 07/06/25  1614   INR  1.3*        Latest Reference Range & Units 07/06/25 21:04   IRON 35 - 150 ug/dL 62   TIBC 240 - 445 ug/dL 220 (L)   % Saturation 25 - 45 % 28   UIBC 110 - 370 ug/dL 158   (L): Data is abnormally low    RADIOLOGY REVIEW  CT abdomen pelvis with contrast 7/6/2025  IMPRESSION:  CHEST:  1.  Multiple pulmonary nodules consistent with metastatic disease.  2. Subcarinal adenopathy. Prominent right paratracheal and right  hilar lymph nodes.      ABDOMEN AND PELVIS:  1.  Nodularity of the contour of the liver with hypertrophy of the  left lobe consistent with hepatocellular disease and cirrhosis.  2. Multiple hypodense and hyperdense masses  in the liver consistent  with metastatic disease or multicentric hepatocellular carcinoma.  3. Enlarged spleen.  4. Portal vein thrombosis.  5. Gastrohepatic, peripancreatic, subhash hepatis and retroperitoneal  adenopathy. Some of the lymph nodes are necrotic.  6. Large amount of ascites. Possible anasarca/hypoproteinemia. Subtle nodularity of the peritoneal lining consistent with tumoral studding.  7. 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  8. 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  9. Sigmoid diverticula with no diverticulitis.  10. Cholelithiasis.    ENDOSCOPIC REVIEW  N/A    IMPRESSION/RECOMMENDATIONS  The patient is a 69 y.o. female with signficant past medical history of hypertension, tobacco use, and alcohol abuse who presents for abdominal pain and bloating.    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  Unknown variceal status.  Currently has ascites.  Currently being worked up for malignancy.  No GLADYS concerning for HRS.  New onset ascites  Metastatic disease to the lymph nodes, lungs, peritoneal lining, and adrenal nodules  Portal vein thrombosis -could be HCC induced or just metastatic in general  Hx of alcohol abuse sober 3 months  Tobacco use    PLAN  -Agree with ultrasound paracentesis with fluid analysis  -Recommend IR guided biopsy  -Will check AFP  -Currently on heparin drip  -Currently on general diet, added low-salt  -Continue supportive care per primary team    Discussed with Dr. Freedman, GI to follow    (Electronically signed byKiana Rincon PA-C on 7/7/2025 at 9:59 AM)    Inpatient consult to Gastroenterology  Consult performed by: Kiana Rincon PA-C  Consult ordered by: Mykel Leggett DO             [1]   Current Facility-Administered Medications:     heparin 25,000 Units in dextrose 5% 250 mL (100 Units/mL) infusion (premix), 0-4,500 Units/hr, intravenous, Continuous, Harsha CHUNG  [place], DO, Last Rate: 13 mL/hr at 07/07/25 0822, 1,300 Units/hr at 07/07/25 0822    heparin bolus from bag 3,000-6,000 Units, 3,000-6,000 Units, intravenous, q4h PRN, Harsha Cosme, DO    HYDROmorphone (Dilaudid) injection 0.4 mg, 0.4 mg, intravenous, q4h PRN, Jasbir Le V, DO    HYDROmorphone PF (Dilaudid) injection 0.2 mg, 0.2 mg, intravenous, q4h PRN, Jasbir Le V, DO, 0.2 mg at 07/07/25 0005    [Held by provider] lisinopril tablet 20 mg, 20 mg, oral, Daily, Jasbir Le V, DO    melatonin tablet 6 mg, 6 mg, oral, Nightly PRN, Jasbir Le V, DO    ondansetron (Zofran) injection 4 mg, 4 mg, intravenous, q8h PRN, Jasbir Le V, DO    polyethylene glycol (Glycolax, Miralax) packet 17 g, 17 g, oral, Daily, Jasbir Le V, DO    sodium chloride 0.9% infusion, 100 mL/hr, intravenous, Continuous, Jasbir Le V, DO, Last Rate: 100 mL/hr at 07/07/25 0632, 100 mL/hr at 07/07/25 0632  [2] History reviewed. No pertinent surgical history.  [3] No family history on file.

## 2025-07-07 NOTE — CARE PLAN
The patient's goals for the shift include not fall    The clinical goals for the shift include Maintain safety and comfort      Problem: Pain - Adult  Goal: Verbalizes/displays adequate comfort level or baseline comfort level  Outcome: Progressing  Flowsheets (Taken 7/7/2025 1806)  Verbalizes/displays adequate comfort level or baseline comfort level:   Encourage patient to monitor pain and request assistance   Assess pain using appropriate pain scale   Implement non-pharmacological measures as appropriate and evaluate response   Administer analgesics based on type and severity of pain and evaluate response   Consider cultural and social influences on pain and pain management   Notify Licensed Independent Practitioner if interventions unsuccessful or patient reports new pain     Problem: Safety - Adult  Goal: Free from fall injury  Outcome: Progressing  Flowsheets (Taken 7/7/2025 1806)  Free from fall injury: Instruct family/caregiver on patient safety

## 2025-07-07 NOTE — PROGRESS NOTES
Physical Therapy    Physical Therapy Evaluation    Patient Name: Lore Aldridge  MRN: [id number]  Today's Date: 7/7/2025   Time Calculation  Start Time: 1158  Stop Time: 1225  Time Calculation (min): 27 min  620/620-A    Assessment/Plan   PT Assessment  Rehab Prognosis: Good  Barriers to Discharge Home: No anticipated barriers  Evaluation/Treatment Tolerance: Patient tolerated treatment well  Strengths: Ability to acquire knowledge, Capable of completing ADLs semi/independent, Housing layout  End of Session Communication: Bedside nurse  Assessment Comment: Pt admitted 7/6 with distended abdomen, pain and nausea. Found portal vein thrombosis, hepatocellular disease and liver masses consistent with mets. Plan for paracentesis today. On evaluation patient completed all mobility independently and has no further acute PT needs.  End of Session Patient Position: Up in chair, Alarm off, not on at start of session  IP OR SWING BED PT PLAN  Inpatient or Swing Bed: Inpatient  PT Plan  PT Plan: PT Eval only  PT Eval Only Reason: No acute PT needs identified  PT Frequency: PT eval only  PT Discharge Recommendations: No further acute PT, No PT needed after discharge  PT Recommended Transfer Status: Independent  PT - OK to Discharge: Yes    Subjective     Current Problem:  1. Portal vein thrombosis          Problem List[1]    General Visit Information:  General  Reason for Referral: PT eval and treat; impaired mobility  Referred By: Jasbir Thorpe DO  Past Medical History Relevant to Rehab: HTN, Tobacco use, Hx of alcohol abuse  Co-Treatment: OT  Co-Treatment Reason: to maximize patient safety and participation  Prior to Session Communication: Bedside nurse  Patient Position Received: Bed, 2 rail up, Alarm off, not on at start of session  General Comment: Patient seen bedside; cooperative, motivated, pleasant    Home Living:  Home Living  Type of Home: House  Lives With: Spouse  Home Layout: One level (ranch)  Bathroom Shower/Tub:  Tub/shower unit  Bathroom Equipment: Grab bars in shower, Hand-held shower hose, Shower chair with back  Home Living Comments: sleeps in adjustable bed    Prior Level of Function:  Prior Function Per Pt/Caregiver Report  Level of [place]: Independent with ADLs and functional transfers, Independent with homemaking with ambulation  Homemaking Assistance:  (shares with spouse)  Prior Function Comments: Drives, shops    Precautions:  Precautions  Medical Precautions: Fall precautions  Precautions Comment: UE IV     Objective     Pain:  Pain Assessment  Pain Assessment: 0-10  0-10 (Numeric) Pain Score: 0 - No pain    Cognition:  Cognition  Overall Cognitive Status: Within Functional Limits    General Assessments:  Activity Tolerance  Endurance: Endurance does not limit participation in activity  Sensation  Light Touch: No apparent deficits  Strength  Strength Comments: Sherwin CARMEN in BLE     Functional Assessments:  Bed Mobility  Bed Mobility:  (completed supine to sit with mod I. Denies dizziness.)  Transfers  Transfer:  (completed from EOB to no device independently. Denies dizziness)  Ambulation/Gait Training  Ambulation/Gait Training Performed:  (completed ~75'x1 with no device independently. Denies dizziness. No LOB.)     Extremity/Trunk Assessments:  RLE   RLE : Within Functional Limits  LLE   LLE : Within Functional Limits    Outcome Measures:  [facility] Basic Mobility  Turning from your back to your side while in a flat bed without using bedrails: None  Moving from lying on your back to sitting on the side of a flat bed without using bedrails: None  Moving to and from bed to chair (including a wheelchair): None  Standing up from a chair using your arms (e.g. wheelchair or bedside chair): None  To walk in hospital room: None  Climbing 3-5 steps with railing: None  Basic Mobility - Total Score: 24     Goals:  Eval Only    Education Documentation  Precautions, taught by Andressa Mcneill, PT at 7/7/2025  3:37  PM.  Learner: Patient  Readiness: Acceptance  Method: Explanation  Response: Verbalizes Understanding    Mobility Training, taught by Andressa Mcneill PT at 7/7/2025  3:37 PM.  Learner: Patient  Readiness: Acceptance  Method: Explanation  Response: Verbalizes Understanding    Education Comments  No comments found.              [1]   Patient Active Problem List  Diagnosis    Portal vein thrombosis

## 2025-07-08 ENCOUNTER — APPOINTMENT (OUTPATIENT)
Dept: RADIOLOGY | Facility: HOSPITAL | Age: 70
DRG: 435 | End: 2025-07-08
Payer: COMMERCIAL

## 2025-07-08 PROBLEM — R91.8 PULMONARY NODULES: Status: ACTIVE | Noted: 2025-07-08

## 2025-07-08 PROBLEM — R18.8 ASCITES: Status: ACTIVE | Noted: 2025-07-08

## 2025-07-08 PROBLEM — F17.200 TOBACCO USE DISORDER: Status: ACTIVE | Noted: 2025-07-08

## 2025-07-08 PROBLEM — E80.6 HYPERBILIRUBINEMIA: Status: ACTIVE | Noted: 2025-07-08

## 2025-07-08 PROBLEM — I10 HYPERTENSION: Status: ACTIVE | Noted: 2025-07-08

## 2025-07-08 PROBLEM — D69.6 THROMBOCYTOPENIA: Status: ACTIVE | Noted: 2025-07-08

## 2025-07-08 PROBLEM — K21.9 GERD (GASTROESOPHAGEAL REFLUX DISEASE): Status: ACTIVE | Noted: 2025-07-08

## 2025-07-08 PROBLEM — E87.1 HYPONATREMIA: Status: ACTIVE | Noted: 2025-07-08

## 2025-07-08 PROBLEM — R16.1 SPLENOMEGALY: Status: ACTIVE | Noted: 2025-07-08

## 2025-07-08 LAB
ALBUMIN FLD-MCNC: 0.6 G/DL
ALBUMIN SERPL BCP-MCNC: 3 G/DL (ref 3.4–5)
ALP SERPL-CCNC: 149 U/L (ref 33–136)
ALT SERPL W P-5'-P-CCNC: 26 U/L (ref 7–45)
AMYLASE FLD-CCNC: <10 U/L
ANION GAP SERPL CALC-SCNC: 11 MMOL/L (ref 10–20)
AST SERPL W P-5'-P-CCNC: 67 U/L (ref 9–39)
BASOPHILS NFR FLD MANUAL: 1 %
BILIRUB SERPL-MCNC: 1.6 MG/DL (ref 0–1.2)
BUN SERPL-MCNC: 9 MG/DL (ref 6–23)
CALCIUM SERPL-MCNC: 8 MG/DL (ref 8.6–10.3)
CHLORIDE SERPL-SCNC: 102 MMOL/L (ref 98–107)
CLARITY FLD: CLEAR
CO2 SERPL-SCNC: 24 MMOL/L (ref 21–32)
COLOR FLD: YELLOW
CREAT SERPL-MCNC: 0.48 MG/DL (ref 0.5–1.05)
EGFRCR SERPLBLD CKD-EPI 2021: >90 ML/MIN/1.73M*2
EOSINOPHIL NFR FLD MANUAL: ABNORMAL %
ERYTHROCYTE [DISTWIDTH] IN BLOOD BY AUTOMATED COUNT: 16 % (ref 11.5–14.5)
GLUCOSE FLD-MCNC: 116 MG/DL
GLUCOSE SERPL-MCNC: 98 MG/DL (ref 74–99)
HCT VFR BLD AUTO: 40.3 % (ref 36–46)
HGB BLD-MCNC: 12.9 G/DL (ref 12–16)
LDH FLD L TO P-CCNC: 101 U/L
LYMPHOCYTES NFR FLD MANUAL: 63 %
MCH RBC QN AUTO: 31.5 PG (ref 26–34)
MCHC RBC AUTO-ENTMCNC: 32 G/DL (ref 32–36)
MCV RBC AUTO: 98 FL (ref 80–100)
MONOS+MACROS NFR FLD MANUAL: 29 %
NEUTROPHILS NFR FLD MANUAL: 4 %
NRBC BLD-RTO: 0 /100 WBCS (ref 0–0)
OTHER CELLS NFR FLD MANUAL: 3 % (ref ?–0)
PATH REVIEW-CELL CT,FLUID: NORMAL
PLATELET # BLD AUTO: 112 X10*3/UL (ref 150–450)
POTASSIUM SERPL-SCNC: 4.1 MMOL/L (ref 3.5–5.3)
PROT FLD-MCNC: 1.2 G/DL
PROT SERPL-MCNC: 7 G/DL (ref 6.4–8.2)
RBC # BLD AUTO: 4.1 X10*6/UL (ref 4–5.2)
RBC # FLD AUTO: <2000 /UL
SODIUM SERPL-SCNC: 133 MMOL/L (ref 136–145)
TOTAL CELLS COUNTED FLD: 100
UFH PPP CHRO-ACNC: 0.4 IU/ML (ref ?–1.1)
WBC # BLD AUTO: 8.2 X10*3/UL (ref 4.4–11.3)
WBC # FLD AUTO: 130 /UL

## 2025-07-08 PROCEDURE — 85027 COMPLETE CBC AUTOMATED: CPT

## 2025-07-08 PROCEDURE — 2500000004 HC RX 250 GENERAL PHARMACY W/ HCPCS (ALT 636 FOR OP/ED): Performed by: RADIOLOGY

## 2025-07-08 PROCEDURE — 36415 COLL VENOUS BLD VENIPUNCTURE: CPT

## 2025-07-08 PROCEDURE — 2500000004 HC RX 250 GENERAL PHARMACY W/ HCPCS (ALT 636 FOR OP/ED)

## 2025-07-08 PROCEDURE — 82150 ASSAY OF AMYLASE: CPT | Mod: PARLAB

## 2025-07-08 PROCEDURE — 83615 LACTATE (LD) (LDH) ENZYME: CPT | Mod: PARLAB

## 2025-07-08 PROCEDURE — 85520 HEPARIN ASSAY: CPT | Performed by: INTERNAL MEDICINE

## 2025-07-08 PROCEDURE — 0W9G3ZZ DRAINAGE OF PERITONEAL CAVITY, PERCUTANEOUS APPROACH: ICD-10-PCS | Performed by: RADIOLOGY

## 2025-07-08 PROCEDURE — 88104 CYTOPATH FL NONGYN SMEARS: CPT | Performed by: PATHOLOGY

## 2025-07-08 PROCEDURE — 84157 ASSAY OF PROTEIN OTHER: CPT | Mod: PARLAB

## 2025-07-08 PROCEDURE — 82945 GLUCOSE OTHER FLUID: CPT | Mod: PARLAB

## 2025-07-08 PROCEDURE — 82042 OTHER SOURCE ALBUMIN QUAN EA: CPT | Mod: PARLAB

## 2025-07-08 PROCEDURE — 99232 SBSQ HOSP IP/OBS MODERATE 35: CPT | Performed by: INTERNAL MEDICINE

## 2025-07-08 PROCEDURE — 87205 SMEAR GRAM STAIN: CPT | Mod: PARLAB

## 2025-07-08 PROCEDURE — 80053 COMPREHEN METABOLIC PANEL: CPT

## 2025-07-08 PROCEDURE — 99232 SBSQ HOSP IP/OBS MODERATE 35: CPT | Performed by: NURSE PRACTITIONER

## 2025-07-08 PROCEDURE — 1200000002 HC GENERAL ROOM WITH TELEMETRY DAILY

## 2025-07-08 PROCEDURE — 49083 ABD PARACENTESIS W/IMAGING: CPT

## 2025-07-08 PROCEDURE — 49083 ABD PARACENTESIS W/IMAGING: CPT | Performed by: RADIOLOGY

## 2025-07-08 PROCEDURE — 85520 HEPARIN ASSAY: CPT

## 2025-07-08 PROCEDURE — 89051 BODY FLUID CELL COUNT: CPT

## 2025-07-08 RX ORDER — LIDOCAINE HYDROCHLORIDE 10 MG/ML
INJECTION, SOLUTION EPIDURAL; INFILTRATION; INTRACAUDAL; PERINEURAL
Status: COMPLETED | OUTPATIENT
Start: 2025-07-08 | End: 2025-07-08

## 2025-07-08 RX ORDER — FAMOTIDINE 20 MG/1
20 TABLET, FILM COATED ORAL EVERY MORNING
Status: DISCONTINUED | OUTPATIENT
Start: 2025-07-08 | End: 2025-07-10 | Stop reason: HOSPADM

## 2025-07-08 RX ORDER — ONDANSETRON HYDROCHLORIDE 2 MG/ML
4 INJECTION, SOLUTION INTRAVENOUS EVERY 4 HOURS PRN
Status: DISCONTINUED | OUTPATIENT
Start: 2025-07-08 | End: 2025-07-10 | Stop reason: HOSPADM

## 2025-07-08 RX ADMIN — HEPARIN SODIUM 1300 UNITS/HR: 10000 INJECTION, SOLUTION INTRAVENOUS at 04:33

## 2025-07-08 RX ADMIN — ONDANSETRON 4 MG: 2 INJECTION INTRAMUSCULAR; INTRAVENOUS at 09:03

## 2025-07-08 RX ADMIN — HEPARIN SODIUM 1300 UNITS/HR: 10000 INJECTION, SOLUTION INTRAVENOUS at 15:06

## 2025-07-08 RX ADMIN — SODIUM CHLORIDE 100 ML/HR: 0.9 INJECTION, SOLUTION INTRAVENOUS at 02:03

## 2025-07-08 RX ADMIN — LIDOCAINE HYDROCHLORIDE 5 ML: 10 INJECTION, SOLUTION EPIDURAL; INFILTRATION; INTRACAUDAL; PERINEURAL at 12:37

## 2025-07-08 RX ADMIN — HYDROMORPHONE HYDROCHLORIDE 0.4 MG: 1 INJECTION, SOLUTION INTRAMUSCULAR; INTRAVENOUS; SUBCUTANEOUS at 11:15

## 2025-07-08 ASSESSMENT — PAIN SCALES - GENERAL
PAINLEVEL_OUTOF10: 0 - NO PAIN
PAINLEVEL_OUTOF10: 0 - NO PAIN
PAINLEVEL_OUTOF10: 8
PAINLEVEL_OUTOF10: 0 - NO PAIN
PAINLEVEL_OUTOF10: 0 - NO PAIN

## 2025-07-08 ASSESSMENT — PAIN - FUNCTIONAL ASSESSMENT
PAIN_FUNCTIONAL_ASSESSMENT: 0-10

## 2025-07-08 ASSESSMENT — PAIN DESCRIPTION - LOCATION: LOCATION: ABDOMEN

## 2025-07-08 NOTE — CARE PLAN
Problem: Pain - Adult  Goal: Verbalizes/displays adequate comfort level or baseline comfort level  Outcome: Progressing  Flowsheets (Taken 7/8/2025 0506)  Verbalizes/displays adequate comfort level or baseline comfort level:   Encourage patient to monitor pain and request assistance   Assess pain using appropriate pain scale   Administer analgesics based on type and severity of pain and evaluate response   Implement non-pharmacological measures as appropriate and evaluate response   Consider cultural and social influences on pain and pain management     Problem: Safety - Adult  Goal: Free from fall injury  Flowsheets (Taken 7/8/2025 0506)  Free from fall injury: Instruct family/caregiver on patient safety   The patient's goals for the shift include not fall    The clinical goals for the shift include pt will remain safe and free from injury or pain.

## 2025-07-08 NOTE — PROGRESS NOTES
07/08/25 1605   Discharge Planning   Living Arrangements Spouse/significant other   Support Systems Spouse/significant other;Family members   Type of Residence Private residence   Number of Stairs to Enter Residence 3   Number of Stairs Within Residence 11   Expected Discharge Disposition Home   Does the patient need discharge transport arranged? No   Intensity of Service   Intensity of Service 0-30 min     Patient lives with her .  She does not use mobility aids, drives and is independent with all ADLs.  AMPAC score is 24/24; no homegoing needs currently identified.  Care Coordination team following for assistance with discharge planning as needed.  Ruth CASANOVA TCC

## 2025-07-08 NOTE — NURSING NOTE
IR consult note:   Plan for liver biopsy on 7/9 at 1500. Dr. Leggett and Sloane RIVAS CNP aware heparin gtt will need stopped 7/9 at 1100 in preparation if patient can hold safely and patient will need to be NPO at midnight.

## 2025-07-08 NOTE — PROCEDURES
Interventional Radiology Brief Postprocedure Note    Attending: Lorie Longoria MD    Assistant:   Staff Role   Apple Roberson, EDILBERTO Radiology Nurse   Lorie Longoria MD Radiologist   Estelle Murcia, ANDREW Radiology Technologist   chaitanya Horton - Observing Patient Procedure       Diagnosis:   1. Portal vein thrombosis            Description of procedure: US guided abdominal paracentesis    Timeout:  Yes    Procedure Area: Procedure Area     Anesthesia:   Local/Topical    Complications: None    Estimated Blood Loss: minimal    Medications (Filter: Administrations occurring from 1309 to 1309 on 07/08/25) As of 07/08/25 1309      None          No specimens collected      See detailed result report with images in PACS.    The patient tolerated the procedure well without incident or complication and is in stable condition.

## 2025-07-08 NOTE — ASSESSMENT & PLAN NOTE
- AFP 6  - Platelets 121 -> 112, secondary to liver cirrhosis  - CT abdomen/pelvis revealed nodularity of the liver with hypertrophy of the left lobe consistent with hepatocellular disease and cirrhosis.  Masses in liver consistent with metastatic disease.  Portal vein thrombosis.  Diffuse adenopathy.  Necrotic right adrenal metastases   - Continue heparin ggt, but hold 6 hours before biopsy  - PRN dilaudid for pain control  - PRN Zofran for nausea/vomiting  - Consult IR for diagnostic and therapeutic paracentesis as well as biopsy of LN or liver for cancer diagnosis   - Paracentesis done 07/08    - Biopsy of the liver scheduled for 07/09  - Consult GI for assistance with hepatocellular disease and cirrhosis, appreciate recommendations  - Consult oncology for new cancer finding, appreciate recommendations    - Cancer unlikely to be HCC due to AFP of 6 and no large, prominent lesion in the liver

## 2025-07-08 NOTE — PROGRESS NOTES
"Lore Aldridge is a 69 y.o. female on day 2 of admission presenting with Portal vein thrombosis.    Subjective   7/8/25 status post abdominal paracentesis earlier today with 3.9 L removed per patient's report.  States feels improving in abdominal pressure.  No new abdominal pain.  Able to tolerate solid diet well.  2 BMs today.  No overt bleeding reported.  H&H stable A&O x 3.  Asterixis negative       Objective     The note was created using voice recognition transcription software. Despite proofreading, unintentional typographical errors may be present. Please contact the GI office with any questions or concerns.     Current Medications: reviewed    A 10 point review of system is negative except for what is mentioned in the HPI    Vital Signs: Reviewed    Physical Exam:  General: no apparent distress, pleasant and cooperative  Skin:  Warm and dry, no jaundice  HEENT: No scleral icterus, no conjunctival pallor, normocephalic, atraumatic, mucous membranes moist  Neck:  atraumatic, trachea midline, no JVD  Chest:  decreased air entry to auscultation bilaterally. No wheezes, rales, or rhonchi  CV:  Regular rate and rhythm.  Positive S1/S2  Abdomen: Mild distension, +BS, soft, mildly tender to palpation in RUQ, no rebound tenderness, no guarding, no rigidity, no discernible ascites   Extremities: no lower extremity edema, Chronic pigmentary changes, no cyanosis  Neurological:  A&Ox3 , no asterixis  Psychiatric: cooperative     Investigations:  Labs, radiological imaging and cardiac work up were reviewed    Last Recorded Vitals  Blood pressure 146/58, pulse 101, temperature 36.3 °C (97.3 °F), resp. rate 20, height 1.6 m (5' 2.99\"), weight 80.1 kg (176 lb 9.4 oz), SpO2 97%.  Intake/Output last 3 Shifts:  I/O last 3 completed shifts:  In: 940.8 (11.7 mL/kg) [I.V.:940.8 (11.7 mL/kg)]  Out: - (0 mL/kg)   Weight: 80.1 kg     Relevant Results        Scheduled medications  Scheduled Medications[1]  Continuous " medications  Continuous Medications[2]  PRN medications  PRN Medications[3]    Results for orders placed or performed during the hospital encounter of 07/06/25 (from the past 24 hours)   Comprehensive Metabolic Panel   Result Value Ref Range    Glucose 98 74 - 99 mg/dL    Sodium 133 (L) 136 - 145 mmol/L    Potassium 4.1 3.5 - 5.3 mmol/L    Chloride 102 98 - 107 mmol/L    Bicarbonate 24 21 - 32 mmol/L    Anion Gap 11 10 - 20 mmol/L    Urea Nitrogen 9 6 - 23 mg/dL    Creatinine 0.48 (L) 0.50 - 1.05 mg/dL    eGFR >90 >60 mL/min/1.73m*2    Calcium 8.0 (L) 8.6 - 10.3 mg/dL    Albumin 3.0 (L) 3.4 - 5.0 g/dL    Alkaline Phosphatase 149 (H) 33 - 136 U/L    Total Protein 7.0 6.4 - 8.2 g/dL    AST 67 (H) 9 - 39 U/L    Bilirubin, Total 1.6 (H) 0.0 - 1.2 mg/dL    ALT 26 7 - 45 U/L   CBC   Result Value Ref Range    WBC 8.2 4.4 - 11.3 x10*3/uL    nRBC 0.0 0.0 - 0.0 /100 WBCs    RBC 4.10 4.00 - 5.20 x10*6/uL    Hemoglobin 12.9 12.0 - 16.0 g/dL    Hematocrit 40.3 36.0 - 46.0 %    MCV 98 80 - 100 fL    MCH 31.5 26.0 - 34.0 pg    MCHC 32.0 32.0 - 36.0 g/dL    RDW 16.0 (H) 11.5 - 14.5 %    Platelets 112 (L) 150 - 450 x10*3/uL   Heparin Assay, UFH   Result Value Ref Range    Heparin Unfractionated 0.4 See Comment Below for Therapeutic Ranges IU/mL   Body Fluid Cell Count   Result Value Ref Range    Color, Fluid Yellow Colorless, Straw, Yellow    Clarity, Fluid Clear Clear    WBC, Fluid 130 See Comment /uL    RBC, Fluid <2,000 see comment /uL   Albumin, Body Fluid   Result Value Ref Range    Albumin, Fluid 0.6 Not established g/dL   Amylase, Body Fluid   Result Value Ref Range    Amylase, Fluid <10 Not established. U/L   Body Fluid Differential   Result Value Ref Range    Neutrophils %, Manual, Fluid 4 see comment %    Lymphocytes %, Manual, Fluid 63 see comment %    Mono/Macrophages %, Manual, Fluid 29 see comment %    Eosinophils %, Manual, Fluid      Basophils %, Manual, Fluid 1 not established %    Unclassified Cells %, Manual,  Fluid 3 (H) not established %    Total Cells Counted, Fluid 100    Glucose, Body Fluid   Result Value Ref Range    Glucose, Fluid 116 Not established mg/dL   Lactate Dehydrogenase, Body Fluid   Result Value Ref Range    LD, Fluid 101 Not established. U/L   Protein, Total, Body Fluid   Result Value Ref Range    Protein, Total Fluid 1.2 Not established g/dL   Pathologist Review-Cell Count,Fluid   Result Value Ref Range    Pathologist Review-Cell Count, Fluid       No malignant cells identified.  Inflammatory cells and mesothelial cells present.         Cardiology, Vascular, and Other Imaging  No other imaging results found for the past 7 days    * Cannot find OR log *  Last relevant procedure:                          Assessment & Plan  Hyperbilirubinemia    Ascites    Thrombocytopenia    GERD (gastroesophageal reflux disease)    Lore hong is a 69 y.o. female with signficant past medical history of hypertension, tobacco use, and alcohol abuse who presents for abdominal pain and bloating.     #Abnormal imaging of liver  #Decompensated alcoholic cirrhosis  #New onset ascites  #Metastatic disease  #Portal vein thrombosis  #History of alcohol abuse    MELD 3.0 17  Unknown variceal status  Currently has ascites, status post abdominal paracentesis earlier today with 3.9 L of ascitic fluid removed, will await for fluid analysis.  Patient currently A&O x 3, asterixis negative    Imaging concerning for cirrhosis and either HCC or metastatic disease.  Metastasis to lymph nodes, lungs peritoneal lining, and adrenal nodules.      Portal vein thrombosis could be HCC induced or just metastatic in general    AFP ordered and pending    Currently on heparin drip, will need to hold tomorrow at 11:00 for IR biopsy.    Low-sodium, high-protein diet discussed    Rest of medical, supportive care as per primary team    Patient discussed with Dr Freedman  Will follow            I spent 30 minutes in the professional and overall care of  this patient.      Hazel Snow, APRN-CNP           [1] famotidine, 20 mg, oral, q AM  [Held by provider] lisinopril, 20 mg, oral, Daily  polyethylene glycol, 17 g, oral, Daily  [2] heparin, 0-4,500 Units/hr, Last Rate: 1,300 Units/hr (07/08/25 1506)  [3] PRN medications: heparin, HYDROmorphone, HYDROmorphone, melatonin, ondansetron

## 2025-07-08 NOTE — ASSESSMENT & PLAN NOTE
- Multiple pulmonary nodules consistent with metastatic disease  - Estimated 25 pack year history of smoking cigarettes  - Encouraged smoking cessation

## 2025-07-08 NOTE — PROGRESS NOTES
BERKLEY Aldridge is a 69 y.o. female on day 2 of admission presenting with Portal vein thrombosis.  Patient was seen and evaluated at bedside this morning reported and was feeling nauseated.  Zofran was ordered and administered.  Patient scheduled for diagnostic paracentesis today with interventional radiology.  Planning for biopsy of the liver tomorrow.  Will hold heparin for 6 hours before procedure.      OBJECTIVE   Vitals  Vitals:    07/07/25 2338 07/08/25 0403 07/08/25 0749 07/08/25 1141   BP: 129/62 147/70 133/78 155/89   BP Location: Left arm Left arm     Patient Position: Lying Lying     Pulse: 89 99 100 102   Resp:    20   Temp: 36.4 °C (97.5 °F) 36.3 °C (97.3 °F) 36.3 °C (97.3 °F)    TempSrc: Temporal Temporal     SpO2: 96% 96% 92% 97%   Weight:       Height:            Labs  Results for orders placed or performed during the hospital encounter of 07/06/25 (from the past 24 hours)   Comprehensive Metabolic Panel   Result Value Ref Range    Glucose 98 74 - 99 mg/dL    Sodium 133 (L) 136 - 145 mmol/L    Potassium 4.1 3.5 - 5.3 mmol/L    Chloride 102 98 - 107 mmol/L    Bicarbonate 24 21 - 32 mmol/L    Anion Gap 11 10 - 20 mmol/L    Urea Nitrogen 9 6 - 23 mg/dL    Creatinine 0.48 (L) 0.50 - 1.05 mg/dL    eGFR >90 >60 mL/min/1.73m*2    Calcium 8.0 (L) 8.6 - 10.3 mg/dL    Albumin 3.0 (L) 3.4 - 5.0 g/dL    Alkaline Phosphatase 149 (H) 33 - 136 U/L    Total Protein 7.0 6.4 - 8.2 g/dL    AST 67 (H) 9 - 39 U/L    Bilirubin, Total 1.6 (H) 0.0 - 1.2 mg/dL    ALT 26 7 - 45 U/L   CBC   Result Value Ref Range    WBC 8.2 4.4 - 11.3 x10*3/uL    nRBC 0.0 0.0 - 0.0 /100 WBCs    RBC 4.10 4.00 - 5.20 x10*6/uL    Hemoglobin 12.9 12.0 - 16.0 g/dL    Hematocrit 40.3 36.0 - 46.0 %    MCV 98 80 - 100 fL    MCH 31.5 26.0 - 34.0 pg    MCHC 32.0 32.0 - 36.0 g/dL    RDW 16.0 (H) 11.5 - 14.5 %    Platelets 112 (L) 150 - 450 x10*3/uL   Heparin Assay, UFH   Result Value Ref Range    Heparin Unfractionated 0.4 See Comment  Below for Therapeutic Ranges IU/mL        Scheduled Medications  Scheduled Medications[1]     Physical Exam  Constitutional:       General: Patient is not in acute distress.  HENT:      Head: Normocephalic and atraumatic.   Eyes:      Extraocular Movements: Extraocular movements intact.      Conjunctiva/sclera: Conjunctivae normal.      Pupils: Pupils are equal, round, and reactive to light.   Cardiovascular:      Rate and Rhythm: Normal rate and regular rhythm.      Heart sounds: No murmur heard.     No friction rub. No gallop.   Pulmonary:      Effort: Pulmonary effort is normal.      Breath sounds: Normal breath sounds. No wheezing, rhonchi or rales.   Abdominal:      General: Abdomen is distended. Bowel sounds are hypoactive.    Palpations: Abdomen is soft. There is no mass.      Tenderness: Nontender. There is no right CVA tenderness, left CVA tenderness, guarding or rebound.   Musculoskeletal:      Right lower leg: No edema.      Left lower leg: No edema.   Skin:     General: Skin is warm and dry.   Neurological:      General: No focal deficit present.      Mental Status: She is alert and oriented to person, place, and time.   Psychiatric:         Mood and Affect: Mood normal.         Behavior: Behavior normal.       Results  VASC US LOWER EXTREMITY VENOUS DUPLEX LEFT;  7/6/2025 1:24 pm    IMPRESSION:  No sonographic evidence for deep vein thrombosis within the evaluated  veins.    CT ANGIO CHEST/ABDOMEN/PELVIS FOR PULMONARY EMBOLISM; CT ABDOMEN PELVIS W IV CONTRAST;  7/6/2025 1:44 pm      IMPRESSION:  CHEST:  1.  Multiple pulmonary nodules consistent with metastatic disease.  2. Subcarinal adenopathy. Prominent right paratracheal and right  hilar lymph nodes.      ABDOMEN AND PELVIS:  1.  Nodularity of the contour of the liver with hypertrophy of the  left lobe consistent with hepatocellular disease and cirrhosis.  2. Multiple hypodense and hyperdense masses in the liver consistent  with metastatic disease or  multicentric hepatocellular carcinoma.  3. Enlarged spleen.  4. Portal vein thrombosis.  5. Gastrohepatic, peripancreatic, subhash hepatis and retroperitoneal  adenopathy. Some of the lymph nodes are necrotic.  6. Large amount of ascites. Possible anasarca/hypoproteinemia. Subtle  nodularity of the peritoneal lining consistent with tumoral studding.  7. 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  8. 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  9. Sigmoid diverticula with no diverticulitis.  10. Cholelithiasis.        ASSESSMENT & PLAN     Assessment & Plan  Portal vein thrombosis  Splenomegaly  Ascites  Hyperbilirubinemia  Thrombocytopenia  - AFP 6  - Platelets 121 -> 112, secondary to liver cirrhosis  - CT abdomen/pelvis revealed nodularity of the liver with hypertrophy of the left lobe consistent with hepatocellular disease and cirrhosis.  Masses in liver consistent with metastatic disease.  Portal vein thrombosis.  Diffuse adenopathy.  Necrotic right adrenal metastases   - Continue heparin ggt, but hold 6 hours before biopsy  - PRN dilaudid for pain control  - PRN Zofran for nausea/vomiting  - Consult IR for diagnostic and therapeutic paracentesis as well as biopsy of LN or liver for cancer diagnosis   - Paracentesis done 07/08    - Biopsy of the liver scheduled for 07/09  - Consult GI for assistance with hepatocellular disease and cirrhosis, appreciate recommendations  - Consult oncology for new cancer finding, appreciate recommendations    - Cancer unlikely to be HCC due to AFP of 6 and no large, prominent lesion in the liver     Pulmonary nodules  Tobacco use disorder  - Multiple pulmonary nodules consistent with metastatic disease  - Estimated 25 pack year history of smoking cigarettes  - Encouraged smoking cessation     Hyponatremia  - Likely secondary to poor oral intake  - 129 -> 133, improving slowly    Hypertension  - Holding home lisinopril due to hyponatremia    GERD (gastroesophageal reflux  disease)  - Continue home famotidine 20mg PO every day      Diet: Regular, low-sodium  DVT ppx: Heparin  IVF: NS 100cc/hr  Consults: Interventional radiology, oncology  Disposition: Home    Suzi Strange DO, [facility]  PGY-1, Internal Medicine  Please SecureChat for any further questions  This is a preliminary note, please await attending attestation for final A/P             [1] [Held by provider] lisinopril, 20 mg, oral, Daily  polyethylene glycol, 17 g, oral, Daily

## 2025-07-09 ENCOUNTER — APPOINTMENT (OUTPATIENT)
Dept: RADIOLOGY | Facility: HOSPITAL | Age: 70
DRG: 435 | End: 2025-07-09
Payer: COMMERCIAL

## 2025-07-09 LAB
ALBUMIN SERPL BCP-MCNC: 2.9 G/DL (ref 3.4–5)
ALP SERPL-CCNC: 166 U/L (ref 33–136)
ALT SERPL W P-5'-P-CCNC: 26 U/L (ref 7–45)
ANION GAP SERPL CALC-SCNC: 12 MMOL/L (ref 10–20)
AST SERPL W P-5'-P-CCNC: 65 U/L (ref 9–39)
BILIRUB SERPL-MCNC: 1.6 MG/DL (ref 0–1.2)
BUN SERPL-MCNC: 8 MG/DL (ref 6–23)
CALCIUM SERPL-MCNC: 8 MG/DL (ref 8.6–10.3)
CHLORIDE SERPL-SCNC: 102 MMOL/L (ref 98–107)
CO2 SERPL-SCNC: 22 MMOL/L (ref 21–32)
CREAT SERPL-MCNC: 0.39 MG/DL (ref 0.5–1.05)
EGFRCR SERPLBLD CKD-EPI 2021: >90 ML/MIN/1.73M*2
ERYTHROCYTE [DISTWIDTH] IN BLOOD BY AUTOMATED COUNT: 16 % (ref 11.5–14.5)
GLUCOSE SERPL-MCNC: 98 MG/DL (ref 74–99)
HCT VFR BLD AUTO: 41 % (ref 36–46)
HGB BLD-MCNC: 13.2 G/DL (ref 12–16)
MCH RBC QN AUTO: 31.2 PG (ref 26–34)
MCHC RBC AUTO-ENTMCNC: 32.2 G/DL (ref 32–36)
MCV RBC AUTO: 97 FL (ref 80–100)
NRBC BLD-RTO: 0 /100 WBCS (ref 0–0)
PLATELET # BLD AUTO: 105 X10*3/UL (ref 150–450)
POTASSIUM SERPL-SCNC: 4.2 MMOL/L (ref 3.5–5.3)
PROT SERPL-MCNC: 6.8 G/DL (ref 6.4–8.2)
RBC # BLD AUTO: 4.23 X10*6/UL (ref 4–5.2)
SODIUM SERPL-SCNC: 132 MMOL/L (ref 136–145)
UFH PPP CHRO-ACNC: 0.4 IU/ML (ref ?–1.1)
UFH PPP CHRO-ACNC: 0.4 IU/ML (ref ?–1.1)
WBC # BLD AUTO: 7.9 X10*3/UL (ref 4.4–11.3)

## 2025-07-09 PROCEDURE — 85520 HEPARIN ASSAY: CPT | Performed by: INTERNAL MEDICINE

## 2025-07-09 PROCEDURE — 0FB13ZX EXCISION OF RIGHT LOBE LIVER, PERCUTANEOUS APPROACH, DIAGNOSTIC: ICD-10-PCS | Performed by: RADIOLOGY

## 2025-07-09 PROCEDURE — 76942 ECHO GUIDE FOR BIOPSY: CPT

## 2025-07-09 PROCEDURE — 2720000007 HC OR 272 NO HCPCS

## 2025-07-09 PROCEDURE — 88341 IMHCHEM/IMCYTCHM EA ADD ANTB: CPT | Performed by: PATHOLOGY

## 2025-07-09 PROCEDURE — 2500000001 HC RX 250 WO HCPCS SELF ADMINISTERED DRUGS (ALT 637 FOR MEDICARE OP)

## 2025-07-09 PROCEDURE — 99153 MOD SED SAME PHYS/QHP EA: CPT

## 2025-07-09 PROCEDURE — 36415 COLL VENOUS BLD VENIPUNCTURE: CPT

## 2025-07-09 PROCEDURE — 2500000004 HC RX 250 GENERAL PHARMACY W/ HCPCS (ALT 636 FOR OP/ED): Performed by: RADIOLOGY

## 2025-07-09 PROCEDURE — 99152 MOD SED SAME PHYS/QHP 5/>YRS: CPT

## 2025-07-09 PROCEDURE — 99232 SBSQ HOSP IP/OBS MODERATE 35: CPT | Performed by: NURSE PRACTITIONER

## 2025-07-09 PROCEDURE — 88342 IMHCHEM/IMCYTCHM 1ST ANTB: CPT | Performed by: PATHOLOGY

## 2025-07-09 PROCEDURE — 84075 ASSAY ALKALINE PHOSPHATASE: CPT

## 2025-07-09 PROCEDURE — 99232 SBSQ HOSP IP/OBS MODERATE 35: CPT | Performed by: INTERNAL MEDICINE

## 2025-07-09 PROCEDURE — 1200000002 HC GENERAL ROOM WITH TELEMETRY DAILY

## 2025-07-09 PROCEDURE — 88342 IMHCHEM/IMCYTCHM 1ST ANTB: CPT | Mod: TC,PARLAB | Performed by: INTERNAL MEDICINE

## 2025-07-09 PROCEDURE — 2500000005 HC RX 250 GENERAL PHARMACY W/O HCPCS: Performed by: RADIOLOGY

## 2025-07-09 PROCEDURE — 85027 COMPLETE CBC AUTOMATED: CPT

## 2025-07-09 PROCEDURE — 99233 SBSQ HOSP IP/OBS HIGH 50: CPT

## 2025-07-09 PROCEDURE — 88307 TISSUE EXAM BY PATHOLOGIST: CPT | Performed by: PATHOLOGY

## 2025-07-09 RX ORDER — FENTANYL CITRATE 50 UG/ML
INJECTION, SOLUTION INTRAMUSCULAR; INTRAVENOUS
Status: COMPLETED | OUTPATIENT
Start: 2025-07-09 | End: 2025-07-09

## 2025-07-09 RX ORDER — LIDOCAINE HYDROCHLORIDE 10 MG/ML
INJECTION, SOLUTION EPIDURAL; INFILTRATION; INTRACAUDAL; PERINEURAL
Status: COMPLETED | OUTPATIENT
Start: 2025-07-09 | End: 2025-07-09

## 2025-07-09 RX ORDER — MIDAZOLAM HYDROCHLORIDE 1 MG/ML
INJECTION, SOLUTION INTRAMUSCULAR; INTRAVENOUS
Status: COMPLETED | OUTPATIENT
Start: 2025-07-09 | End: 2025-07-09

## 2025-07-09 RX ADMIN — FAMOTIDINE 20 MG: 20 TABLET, FILM COATED ORAL at 08:58

## 2025-07-09 RX ADMIN — Medication 3 L/MIN: at 15:00

## 2025-07-09 RX ADMIN — MIDAZOLAM 1 MG: 1 INJECTION INTRAMUSCULAR; INTRAVENOUS at 15:21

## 2025-07-09 RX ADMIN — LIDOCAINE HYDROCHLORIDE 10 ML: 10 INJECTION, SOLUTION EPIDURAL; INFILTRATION; INTRACAUDAL; PERINEURAL at 15:27

## 2025-07-09 RX ADMIN — Medication 1 L/MIN: at 21:03

## 2025-07-09 RX ADMIN — FENTANYL CITRATE 50 MCG: 50 INJECTION, SOLUTION INTRAMUSCULAR; INTRAVENOUS at 15:21

## 2025-07-09 ASSESSMENT — PAIN SCALES - GENERAL
PAINLEVEL_OUTOF10: 0 - NO PAIN

## 2025-07-09 ASSESSMENT — PAIN - FUNCTIONAL ASSESSMENT
PAIN_FUNCTIONAL_ASSESSMENT: 0-10

## 2025-07-09 NOTE — HOSPITAL COURSE
Lore Aldridge is a 69-year-old female who presented to the ED on 07/06 with the chief complaint of abdominal pain and nausea.  She has a PMHx of tobacco use disorder, alcohol use disorder and HTN.  She stated that the nausea had began about two months prior and was associated with dry heaves.  She started taking Zantac, but her symptoms continued to worsen.  She did report losing weight, but noticed her abdomen was increasing in size, then over the previous few weeks, she began having unprovoked, non-radiating intermittent sharp pain in her RUQ with LLE edema.      CT scan was performed and revealed nodularity of liver with hypertrophy of the left lobe consistent with hepatocellular disease and cirrhosis. Masses in the liver consistent with metastatic disease vs HCC. Portal vein thrombosis. Diffuse adenopathy. Necrotic right adrenal metastasis. CTA chest showed multiple pulmonary nodules consistent with metastatic disease. Subcarinal adenopathy. Prominent right paratracheal and right hilar lymph nodes. Left lower extremity ultrasound showed no evidence of DVT. Patient was started on heparin ggt.     A diagnostic and therapeutic paracentesis was performed by interventional radiology.  They drained 3.9L of fluid off of her abdomen.  The fluid analysis was normal.  No PMNs, bacteria or malignant cells were seen.  Patient had a biopsy of the liver mass on 07/09 for confirmation of primary source of probable malignancy.  Patient transitioned to oral anticoagulation, then cleared to be discharged.  Patient will require follow-up with GI, hem/onc and PCP upon discharge.

## 2025-07-09 NOTE — PRE-PROCEDURE NOTE
Interventional Radiology Preprocedure Note    Indication for procedure: The encounter diagnosis was Portal vein thrombosis.    Relevant review of systems: NA    Relevant Labs:   Lab Results   Component Value Date    CREATININE 0.39 (L) 07/09/2025    EGFR >90 07/09/2025    INR 1.3 (H) 07/06/2025    PROTIME 14.5 (H) 07/06/2025       Planned Sedation/Anesthesia: Minimal    Airway assessment: normal    Directed physical examination:    Aox3  No increased work of breathing.   No acute distress      Mallampati: II (hard and soft palate, upper portion of tonsils and uvula visible)    ASA Score: ASA 2 - Patient with mild systemic disease with no functional limitations    Benefits, risks and alternatives of procedure and planned sedation have been discussed with the patient and/or their representative. All questions answered and they agree to proceed.

## 2025-07-09 NOTE — PROCEDURES
Interventional Radiology Brief Postprocedure Note    Attending: Lorie Longoria MD    Assistant:   Staff Role   Danae Shannon, EDILBERTO Radiology Nurse   Lorie Longoria MD Radiologist   ANDREW Christopher Radiology Technologist       Diagnosis:   1. Portal vein thrombosis  Surgical Pathology Exam    Surgical Pathology Exam          Description of procedure: US guided needle liver biopsy right hepatic mass 18G x 2. Note made of small amount of bleeding from capsule post biopsy despite closure with 3 Gelfoam plegets; small amoutn of remaining ascites aspirated. 1. 2 L in total removed. No definite active bleeding at end of procedure.     Timeout:  Yes    Procedure Area: Procedure Area     Anesthesia:   Conscious Sedation    Complications: None    Estimated Blood Loss: minimal    Medications (Filter: Administrations occurring from 1600 to 1600 on 07/09/25) As of 07/09/25 1600      None          ID Type Source Tests Collected by Time   1 : liver biopsy core Tissue LIVER BIOPSY RIGHT SURGICAL PATHOLOGY EXAM Lorie Longoria MD 7/9/2025 [id number]         See detailed result report with images in PACS.    The patient tolerated the procedure well without incident or complication and is in stable condition.

## 2025-07-09 NOTE — PROGRESS NOTES
"Internal Medicine Progress Note    PATIENT NAME: Lore Aldridge  MRN: [id number]  DATE: 7/9/2025       Subjective   Patient seen and examined at bedside today, no new complaints, awaiting biopsy.  Provided updates       Objective   /63   Pulse 91   Temp 35.9 °C (96.6 °F)   Resp 14   Ht 1.6 m (5' 2.99\")   Wt 80.1 kg (176 lb 9.4 oz)   SpO2 94%   BMI 31.29 kg/m²     General: Pleasant and cooperative  HEENT: Normocephalic, atraumatic, mucus membranes moist.  Eyes: EOMI  Neck:  Trachea midline.    Chest: Symmetric chest expansion, CTA bilaterally   CV:  Regular rate, regular rhythm.  Positive S1/S2.  Abdomen: soft, non-tender, non-distended, no guarding or peritoneal signs   Extremities:  No lower extremity edema  Neurological:  no obvious focal deficits   Psych: appropriate affect and behavior   Skin:  Warm and dry       Medications     Scheduled medications  Scheduled Medications[1]  Continuous medications  Continuous Medications[2]  PRN medications  PRN Medications[3]        Assessment / Plan   dalia Aldridge is a 69 y.o. female with a history of hypertension, tobacco abuse, alcohol abuse who presented to [facility] complaining of nausea, loss of appetite and right upper quadrant pain.       #Multiple liver masses, concern for metastatic disease  #Multiple pulmonary nodules with subcarinal adenopathy  #Cirrhosis  #Portal vein thrombosis  #Splenomegaly  #Anasarca  #Hyperbilirubinemia  #Renal angiomyolipoma  - Patient to undergo biopsy today, she will need an outpatient PET scan and outpatient follow-up with hematology/oncology once the biopsy results have been completed.     This is a preliminary note written by the resident. Please wait for attending addendum for finalization of note and recommendations.     Dr. Burt Roach  Internal Medicine              [1] famotidine, 20 mg, oral, q AM  [Held by provider] lisinopril, 20 mg, oral, Daily  polyethylene glycol, 17 g, oral, Daily  [2] [Held by " provider] heparin, 0-4,500 Units/hr, Last Rate: 1,300 Units/hr (07/08/25 1506)  [3] PRN medications: heparin, HYDROmorphone, HYDROmorphone, melatonin, ondansetron

## 2025-07-09 NOTE — PROGRESS NOTES
"Lore Aldridge is a 69 y.o. female on day 3 of admission presenting with Portal vein thrombosis.    Subjective   7/8/25 status post abdominal paracentesis earlier today with 3.9 L removed per patient's report.  States feels improving in abdominal pressure.  No new abdominal pain.  Able to tolerate solid diet well.  2 BMs today.  No overt bleeding reported.  H&H stable A&O x 3.  Asterixis negative   7/9/2025 no significant events overnight.  No new abdominal discomfort.  Able to tolerate regular diet well.  No overt bleeding.  H&H stable.  Did have formed BM today.  Abdomen soft, nontender, bowel sounds present.  Awaits for liver biopsy at the moment of exam, is n.p.o. today for that      Objective     The note was created using voice recognition transcription software. Despite proofreading, unintentional typographical errors may be present. Please contact the GI office with any questions or concerns.     Current Medications: reviewed    A 10 point review of system is negative except for what is mentioned in the HPI    Vital Signs: Reviewed    Physical Exam:  General: no apparent distress, pleasant and cooperative  Skin:  Warm and dry, no jaundice  HEENT: No scleral icterus, no conjunctival pallor, normocephalic, atraumatic, mucous membranes moist  Neck:  atraumatic, trachea midline, no JVD  Chest:  decreased air entry to auscultation bilaterally. No wheezes, rales, or rhonchi  CV:  Regular rate and rhythm.  Positive S1/S2  Abdomen: Mild distension, +BS, soft, mildly tender to palpation in RUQ, no rebound tenderness, no guarding, no rigidity, no discernible ascites   Extremities: no lower extremity edema, Chronic pigmentary changes, no cyanosis  Neurological:  A&Ox3 , no asterixis  Psychiatric: cooperative     Investigations:  Labs, radiological imaging and cardiac work up were reviewed    Last Recorded Vitals  Blood pressure 141/65, pulse 83, temperature 36.2 °C (97.2 °F), resp. rate 18, height 1.6 m (5' 2.99\"), " weight 80.1 kg (176 lb 9.4 oz), SpO2 97%.  Intake/Output last 3 Shifts:  I/O last 3 completed shifts:  In: - (0 mL/kg)   Out: 3900 (48.7 mL/kg) [Drains:3900]  Weight: 80.1 kg     Relevant Results        Scheduled medications  Scheduled Medications[1]  Continuous medications  Continuous Medications[2]  PRN medications  PRN Medications[3]    Results for orders placed or performed during the hospital encounter of 07/06/25 (from the past 24 hours)   Heparin Assay, UFH   Result Value Ref Range    Heparin Unfractionated 0.4 See Comment Below for Therapeutic Ranges IU/mL   Comprehensive Metabolic Panel   Result Value Ref Range    Glucose 98 74 - 99 mg/dL    Sodium 132 (L) 136 - 145 mmol/L    Potassium 4.2 3.5 - 5.3 mmol/L    Chloride 102 98 - 107 mmol/L    Bicarbonate 22 21 - 32 mmol/L    Anion Gap 12 10 - 20 mmol/L    Urea Nitrogen 8 6 - 23 mg/dL    Creatinine 0.39 (L) 0.50 - 1.05 mg/dL    eGFR >90 >60 mL/min/1.73m*2    Calcium 8.0 (L) 8.6 - 10.3 mg/dL    Albumin 2.9 (L) 3.4 - 5.0 g/dL    Alkaline Phosphatase 166 (H) 33 - 136 U/L    Total Protein 6.8 6.4 - 8.2 g/dL    AST 65 (H) 9 - 39 U/L    Bilirubin, Total 1.6 (H) 0.0 - 1.2 mg/dL    ALT 26 7 - 45 U/L   CBC   Result Value Ref Range    WBC 7.9 4.4 - 11.3 x10*3/uL    nRBC 0.0 0.0 - 0.0 /100 WBCs    RBC 4.23 4.00 - 5.20 x10*6/uL    Hemoglobin 13.2 12.0 - 16.0 g/dL    Hematocrit 41.0 36.0 - 46.0 %    MCV 97 80 - 100 fL    MCH 31.2 26.0 - 34.0 pg    MCHC 32.2 32.0 - 36.0 g/dL    RDW 16.0 (H) 11.5 - 14.5 %    Platelets 105 (L) 150 - 450 x10*3/uL   Heparin Assay   Result Value Ref Range    Heparin Unfractionated 0.4 See Comment Below for Therapeutic Ranges IU/mL         Cardiology, Vascular, and Other Imaging  No other imaging results found for the past 7 days    * Cannot find OR log *  Last relevant procedure:        Assessment & Plan  Hyperbilirubinemia    Ascites    Thrombocytopenia    GERD (gastroesophageal reflux disease)    Lore hnog is a 69 y.o. female with  signficant past medical history of hypertension, tobacco use, and alcohol abuse who presents for abdominal pain and bloating.     #Abnormal imaging of liver  #Decompensated alcoholic cirrhosis  #New onset ascites  #Metastatic disease  #Portal vein thrombosis  #History of alcohol abuse    MELD 3.0 16    Unknown variceal status  Currently has ascites, status post abdominal paracentesis earlier today with 3.9 L of ascitic fluid removed, will await for fluid analysis.  Patient currently A&O x 3, asterixis negative    Imaging concerning for cirrhosis and either HCC or metastatic disease.  Metastasis to lymph nodes, lungs peritoneal lining, and adrenal nodules.      Portal vein thrombosis could be HCC induced or just metastatic in general    Is on heparin drip    AFP 6    Underwent abdominal paracentesis yesterday, with removal of 3.9 L, fluid analysis negative for SBP, consistent with ascites of cirrhosis (SAAG 2.4, total protein 1.2)    Awaits for liver biopsy at the time of exam    Low-sodium, high-protein diet discussed    Rest of medical, supportive care as per primary team    Patient discussed with Dr Freedman  Will follow        I spent 30 minutes in the professional and overall care of this patient.      Hazel Snow, APRN-CNP             [1] famotidine, 20 mg, oral, q AM  [Held by provider] lisinopril, 20 mg, oral, Daily  oxygen, , inhalation, Continuous - Inhalation  polyethylene glycol, 17 g, oral, Daily     [2] [Held by provider] heparin, 0-4,500 Units/hr, Last Rate: 1,300 Units/hr (07/08/25 1506)     [3] PRN medications: heparin, HYDROmorphone, HYDROmorphone, melatonin, ondansetron

## 2025-07-09 NOTE — PROGRESS NOTES
SUBJECTIVE     Lore Aldridge is a 69 y.o. female on day 3 of admission presenting with Portal vein thrombosis.  Patient was seen and evaluated at bedside this morning reported and was feeling nauseated.  Zofran was ordered and administered.  Patient scheduled for diagnostic paracentesis today with interventional radiology.  Planning for biopsy of the liver tomorrow.  Will hold heparin for 6 hours before procedure.      OBJECTIVE   Vitals  Vitals:    07/08/25 1938 07/08/25 2343 07/09/25 0424 07/09/25 0751   BP: 120/56 136/63 136/65 144/67   BP Location:       Patient Position:       Pulse: 91 86 82 86   Resp: 17 18 17    Temp: 36.2 °C (97.2 °F) 36.6 °C (97.9 °F) 35.6 °C (96.1 °F) 35.5 °C (95.9 °F)   TempSrc: Temporal Temporal Temporal Temporal   SpO2: 94% 96% 97% 94%   Weight:       Height:            Labs  Results for orders placed or performed during the hospital encounter of 07/06/25 (from the past 24 hours)   Body Fluid Cell Count   Result Value Ref Range    Color, Fluid Yellow Colorless, Straw, Yellow    Clarity, Fluid Clear Clear    WBC, Fluid 130 See Comment /uL    RBC, Fluid <2,000 see comment /uL   Sterile Fluid Culture/Smear    Specimen: Ascites Fluid   Result Value Ref Range    Sterile Fluid Culture/Smear No growth to date     Gram Stain No polymorphonuclear leukocytes seen     Gram Stain No organisms seen    Albumin, Body Fluid   Result Value Ref Range    Albumin, Fluid 0.6 Not established g/dL   Amylase, Body Fluid   Result Value Ref Range    Amylase, Fluid <10 Not established. U/L   Body Fluid Differential   Result Value Ref Range    Neutrophils %, Manual, Fluid 4 see comment %    Lymphocytes %, Manual, Fluid 63 see comment %    Mono/Macrophages %, Manual, Fluid 29 see comment %    Eosinophils %, Manual, Fluid      Basophils %, Manual, Fluid 1 not established %    Unclassified Cells %, Manual, Fluid 3 (H) not established %    Total Cells Counted, Fluid 100    Glucose, Body Fluid   Result Value Ref  Range    Glucose, Fluid 116 Not established mg/dL   Lactate Dehydrogenase, Body Fluid   Result Value Ref Range    LD, Fluid 101 Not established. U/L   Protein, Total, Body Fluid   Result Value Ref Range    Protein, Total Fluid 1.2 Not established g/dL   Pathologist Review-Cell Count,Fluid   Result Value Ref Range    Pathologist Review-Cell Count, Fluid       No malignant cells identified.  Inflammatory cells and mesothelial cells present.   Heparin Assay, UFH   Result Value Ref Range    Heparin Unfractionated 0.4 See Comment Below for Therapeutic Ranges IU/mL   Comprehensive Metabolic Panel   Result Value Ref Range    Glucose 98 74 - 99 mg/dL    Sodium 132 (L) 136 - 145 mmol/L    Potassium 4.2 3.5 - 5.3 mmol/L    Chloride 102 98 - 107 mmol/L    Bicarbonate 22 21 - 32 mmol/L    Anion Gap 12 10 - 20 mmol/L    Urea Nitrogen 8 6 - 23 mg/dL    Creatinine 0.39 (L) 0.50 - 1.05 mg/dL    eGFR >90 >60 mL/min/1.73m*2    Calcium 8.0 (L) 8.6 - 10.3 mg/dL    Albumin 2.9 (L) 3.4 - 5.0 g/dL    Alkaline Phosphatase 166 (H) 33 - 136 U/L    Total Protein 6.8 6.4 - 8.2 g/dL    AST 65 (H) 9 - 39 U/L    Bilirubin, Total 1.6 (H) 0.0 - 1.2 mg/dL    ALT 26 7 - 45 U/L   CBC   Result Value Ref Range    WBC 7.9 4.4 - 11.3 x10*3/uL    nRBC 0.0 0.0 - 0.0 /100 WBCs    RBC 4.23 4.00 - 5.20 x10*6/uL    Hemoglobin 13.2 12.0 - 16.0 g/dL    Hematocrit 41.0 36.0 - 46.0 %    MCV 97 80 - 100 fL    MCH 31.2 26.0 - 34.0 pg    MCHC 32.2 32.0 - 36.0 g/dL    RDW 16.0 (H) 11.5 - 14.5 %    Platelets 105 (L) 150 - 450 x10*3/uL   Heparin Assay   Result Value Ref Range    Heparin Unfractionated 0.4 See Comment Below for Therapeutic Ranges IU/mL        Scheduled Medications  Scheduled Medications[1]     Physical Exam  Constitutional:       General: Patient is not in acute distress.  HENT:      Head: Normocephalic and atraumatic.   Eyes:      Extraocular Movements: Extraocular movements intact.      Conjunctiva/sclera: Conjunctivae normal.      Pupils: Pupils are  equal, round, and reactive to light.   Cardiovascular:      Rate and Rhythm: Normal rate and regular rhythm.      Heart sounds: No murmur heard.     No friction rub. No gallop.   Pulmonary:      Effort: Pulmonary effort is normal.      Breath sounds: Normal breath sounds. No wheezing, rhonchi or rales.   Abdominal:      General: Abdomen is distended. Bowel sounds are hypoactive.    Palpations: Abdomen is soft. There is no mass.      Tenderness: Nontender. There is no right CVA tenderness, left CVA tenderness, guarding or rebound.   Musculoskeletal:      Right lower leg: No edema.      Left lower leg: No edema.   Skin:     General: Skin is warm and dry.   Neurological:      General: No focal deficit present.      Mental Status: She is alert and oriented to person, place, and time.   Psychiatric:         Mood and Affect: Mood normal.         Behavior: Behavior normal.       Results  VASC US LOWER EXTREMITY VENOUS DUPLEX LEFT;  7/6/2025 1:24 pm    IMPRESSION:  No sonographic evidence for deep vein thrombosis within the evaluated  veins.    CT ANGIO CHEST/ABDOMEN/PELVIS FOR PULMONARY EMBOLISM; CT ABDOMEN PELVIS W IV CONTRAST;  7/6/2025 1:44 pm      IMPRESSION:  CHEST:  1.  Multiple pulmonary nodules consistent with metastatic disease.  2. Subcarinal adenopathy. Prominent right paratracheal and right  hilar lymph nodes.      ABDOMEN AND PELVIS:  1.  Nodularity of the contour of the liver with hypertrophy of the  left lobe consistent with hepatocellular disease and cirrhosis.  2. Multiple hypodense and hyperdense masses in the liver consistent  with metastatic disease or multicentric hepatocellular carcinoma.  3. Enlarged spleen.  4. Portal vein thrombosis.  5. Gastrohepatic, peripancreatic, subhash hepatis and retroperitoneal  adenopathy. Some of the lymph nodes are necrotic.  6. Large amount of ascites. Possible anasarca/hypoproteinemia. Subtle  nodularity of the peritoneal lining consistent with tumoral studding.  7. 2.2  x 1.1 cm necrotic right adrenal metastasis.  8. 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  9. Sigmoid diverticula with no diverticulitis.  10. Cholelithiasis.        ASSESSMENT & PLAN     Assessment & Plan  Portal vein thrombosis  Splenomegaly  Ascites  Hyperbilirubinemia  Thrombocytopenia  - AFP 6  - Platelets 121 -> 112, secondary to liver cirrhosis  - CT abdomen/pelvis revealed nodularity of the liver with hypertrophy of the left lobe consistent with hepatocellular disease and cirrhosis.  Masses in liver consistent with metastatic disease.  Portal vein thrombosis.  Diffuse adenopathy.  Necrotic right adrenal metastases   - Continue heparin ggt, but hold 6 hours before biopsy  - PRN dilaudid for pain control  - PRN Zofran for nausea/vomiting  - Consult IR for diagnostic and therapeutic paracentesis as well as biopsy of LN or liver for cancer diagnosis   - Paracentesis done 07/08 - fluid analysis was normal without PMNs, bacteria or malignant cells found    - Biopsy of the liver scheduled for 07/09  - Consult GI for assistance with hepatocellular disease and cirrhosis, appreciate recommendations   - Follow-up outpatient after discharge  - Consult oncology for new cancer finding, appreciate recommendations    - Cancer unlikely to be HCC due to AFP of 6 and no   large, prominent lesion in the liver    - Follow-up outpatient after discharge     Pulmonary nodules  Tobacco use disorder  - Multiple pulmonary nodules consistent with metastatic disease  - Estimated 25 pack year history of smoking cigarettes  - Encouraged smoking cessation     Hyponatremia  - Likely secondary to poor oral intake  - 129 -> 133, improving slowly    Hypertension  - Holding home lisinopril due to hyponatremia    GERD (gastroesophageal reflux disease)  - Continue home famotidine 20mg PO every day      Diet: Regular, low-sodium  DVT ppx: Heparin  IVF: None  Consults: Interventional radiology, oncology  Disposition: Home    Suzi Strange DO,  [facility]  PGY-1, Internal Medicine  Please SecureChat for any further questions  This is a preliminary note, please await attending attestation for final A/P             [1] famotidine, 20 mg, oral, q AM  [Held by provider] lisinopril, 20 mg, oral, Daily  polyethylene glycol, 17 g, oral, Daily

## 2025-07-09 NOTE — ASSESSMENT & PLAN NOTE
- AFP 6  - Platelets 121 -> 112, secondary to liver cirrhosis  - CT abdomen/pelvis revealed nodularity of the liver with hypertrophy of the left lobe consistent with hepatocellular disease and cirrhosis.  Masses in liver consistent with metastatic disease.  Portal vein thrombosis.  Diffuse adenopathy.  Necrotic right adrenal metastases   - Continue heparin ggt, but hold 6 hours before biopsy  - PRN dilaudid for pain control  - PRN Zofran for nausea/vomiting  - Consult IR for diagnostic and therapeutic paracentesis as well as biopsy of LN or liver for cancer diagnosis   - Paracentesis done 07/08 - fluid analysis was normal without PMNs, bacteria or malignant cells found    - Biopsy of the liver scheduled for 07/09  - Consult GI for assistance with hepatocellular disease and cirrhosis, appreciate recommendations   - Follow-up outpatient after discharge  - Consult oncology for new cancer finding, appreciate recommendations    - Cancer unlikely to be HCC due to AFP of 6 and no   large, prominent lesion in the liver    - Follow-up outpatient after discharge

## 2025-07-10 VITALS
TEMPERATURE: 97 F | HEIGHT: 63 IN | DIASTOLIC BLOOD PRESSURE: 67 MMHG | SYSTOLIC BLOOD PRESSURE: 150 MMHG | WEIGHT: 176.59 LBS | RESPIRATION RATE: 17 BRPM | OXYGEN SATURATION: 97 % | BODY MASS INDEX: 31.29 KG/M2 | HEART RATE: 95 BPM

## 2025-07-10 LAB
ALBUMIN SERPL BCP-MCNC: 3.2 G/DL (ref 3.4–5)
ALP SERPL-CCNC: 171 U/L (ref 33–136)
ALT SERPL W P-5'-P-CCNC: 30 U/L (ref 7–45)
ANION GAP SERPL CALC-SCNC: 8 MMOL/L (ref 10–20)
AST SERPL W P-5'-P-CCNC: 72 U/L (ref 9–39)
BILIRUB SERPL-MCNC: 1.5 MG/DL (ref 0–1.2)
BUN SERPL-MCNC: 12 MG/DL (ref 6–23)
CALCIUM SERPL-MCNC: 8.4 MG/DL (ref 8.6–10.3)
CHLORIDE SERPL-SCNC: 102 MMOL/L (ref 98–107)
CO2 SERPL-SCNC: 25 MMOL/L (ref 21–32)
CREAT SERPL-MCNC: 0.47 MG/DL (ref 0.5–1.05)
EGFRCR SERPLBLD CKD-EPI 2021: >90 ML/MIN/1.73M*2
ERYTHROCYTE [DISTWIDTH] IN BLOOD BY AUTOMATED COUNT: 16.5 % (ref 11.5–14.5)
GLUCOSE SERPL-MCNC: 98 MG/DL (ref 74–99)
HCT VFR BLD AUTO: 48.7 % (ref 36–46)
HGB BLD-MCNC: 14.5 G/DL (ref 12–16)
MCH RBC QN AUTO: 32.2 PG (ref 26–34)
MCHC RBC AUTO-ENTMCNC: 29.8 G/DL (ref 32–36)
MCV RBC AUTO: 108 FL (ref 80–100)
NRBC BLD-RTO: 0 /100 WBCS (ref 0–0)
PLATELET # BLD AUTO: 93 X10*3/UL (ref 150–450)
POTASSIUM SERPL-SCNC: 4.1 MMOL/L (ref 3.5–5.3)
PROT SERPL-MCNC: 7.3 G/DL (ref 6.4–8.2)
RBC # BLD AUTO: 4.51 X10*6/UL (ref 4–5.2)
SODIUM SERPL-SCNC: 131 MMOL/L (ref 136–145)
UFH PPP CHRO-ACNC: <0.1 IU/ML (ref ?–1.1)
WBC # BLD AUTO: 8.2 X10*3/UL (ref 4.4–11.3)

## 2025-07-10 PROCEDURE — 2500000005 HC RX 250 GENERAL PHARMACY W/O HCPCS: Performed by: RADIOLOGY

## 2025-07-10 PROCEDURE — 2500000001 HC RX 250 WO HCPCS SELF ADMINISTERED DRUGS (ALT 637 FOR MEDICARE OP)

## 2025-07-10 PROCEDURE — 85027 COMPLETE CBC AUTOMATED: CPT

## 2025-07-10 PROCEDURE — 85520 HEPARIN ASSAY: CPT | Performed by: INTERNAL MEDICINE

## 2025-07-10 PROCEDURE — 36415 COLL VENOUS BLD VENIPUNCTURE: CPT

## 2025-07-10 PROCEDURE — 84075 ASSAY ALKALINE PHOSPHATASE: CPT

## 2025-07-10 PROCEDURE — 99239 HOSP IP/OBS DSCHRG MGMT >30: CPT

## 2025-07-10 PROCEDURE — 99233 SBSQ HOSP IP/OBS HIGH 50: CPT

## 2025-07-10 RX ORDER — ONDANSETRON 4 MG/1
4 TABLET, FILM COATED ORAL EVERY 8 HOURS PRN
Qty: 20 TABLET | Refills: 0 | Status: SHIPPED | OUTPATIENT
Start: 2025-07-10 | End: 2025-07-28

## 2025-07-10 RX ORDER — ONDANSETRON 4 MG/1
4 TABLET, ORALLY DISINTEGRATING ORAL EVERY 8 HOURS PRN
Status: DISCONTINUED | OUTPATIENT
Start: 2025-07-10 | End: 2025-07-10 | Stop reason: HOSPADM

## 2025-07-10 RX ADMIN — FAMOTIDINE 20 MG: 20 TABLET, FILM COATED ORAL at 09:59

## 2025-07-10 RX ADMIN — Medication 3 L/MIN: at 10:02

## 2025-07-10 RX ADMIN — LISINOPRIL 20 MG: 20 TABLET ORAL at 09:58

## 2025-07-10 RX ADMIN — APIXABAN 5 MG: 5 TABLET, FILM COATED ORAL at 10:01

## 2025-07-10 NOTE — DISCHARGE SUMMARY
Discharge Diagnosis  Portal vein thrombosis secondary to malignancy       Issues Requiring Follow-Up  PET scan and hematology/oncology follow-up for malignancy  GI follow-up for cirrhosis    Discharge Meds    Test Results Pending At Discharge  Pending Labs       Order Current Status    Surgical Pathology Exam In process    Sterile Fluid Culture/Smear Preliminary result            Hospital Course  Lore Aldridge is a 69-year-old female who presented to the ED on 07/06 with the chief complaint of abdominal pain and nausea.  She has a PMHx of tobacco use disorder, alcohol use disorder and HTN.  She stated that the nausea had began about two months prior and was associated with dry heaves.  She started taking Zantac, but her symptoms continued to worsen.  She did report losing weight, but noticed her abdomen was increasing in size, then over the previous few weeks, she began having unprovoked, non-radiating intermittent sharp pain in her RUQ with LLE edema.      CT scan was performed and revealed nodularity of liver with hypertrophy of the left lobe consistent with hepatocellular disease and cirrhosis. Masses in the liver consistent with metastatic disease vs HCC. Portal vein thrombosis. Diffuse adenopathy. Necrotic right adrenal metastasis. CTA chest showed multiple pulmonary nodules consistent with metastatic disease. Subcarinal adenopathy. Prominent right paratracheal and right hilar lymph nodes. Left lower extremity ultrasound showed no evidence of DVT. Patient was started on heparin ggt.     A diagnostic and therapeutic paracentesis was performed by interventional radiology.  They drained 3.9L of fluid off of her abdomen.  The fluid analysis was normal.  No PMNs, bacteria or malignant cells were seen.  Patient had a biopsy of the liver mass on 07/09 for confirmation of primary source of probable malignancy.  Patient transitioned to oral anticoagulation, then cleared to be discharged.  Patient will require follow-up  with GI, hem/onc and PCP upon discharge.      Pertinent Physical Exam At Time of Discharge  Physical Exam  Constitutional:       General: She is not in acute distress.  HENT:      Head: Normocephalic and atraumatic.   Eyes:      Extraocular Movements: Extraocular movements intact.      Pupils: Pupils are equal, round, and reactive to light.   Cardiovascular:      Rate and Rhythm: Normal rate and regular rhythm.      Heart sounds: No murmur heard.     No friction rub. No gallop.   Pulmonary:      Effort: Pulmonary effort is normal.      Breath sounds: Normal breath sounds. No wheezing, rhonchi or rales.   Abdominal:      General: Bowel sounds are normal. There is distension.      Palpations: Abdomen is soft. There is no mass.      Tenderness: There is no abdominal tenderness. There is no guarding or rebound.   Musculoskeletal:      Right lower leg: No edema.      Left lower leg: No edema.   Skin:     General: Skin is warm and dry.   Neurological:      General: No focal deficit present.      Mental Status: She is alert and oriented to person, place, and time.   Psychiatric:         Mood and Affect: Mood normal.         Behavior: Behavior normal.         Outpatient Follow-Up  No future appointments.      Suzi Strange DO

## 2025-07-10 NOTE — PROGRESS NOTES
"Internal Medicine Progress Note    PATIENT NAME: Lore Aldridge  MRN: [id number]  DATE: 7/10/2025       Subjective          Objective   /67   Pulse 95   Temp 36.1 °C (97 °F)   Resp 17   Ht 1.6 m (5' 2.99\")   Wt 80.1 kg (176 lb 9.4 oz)   SpO2 97%   BMI 31.29 kg/m²     General: Pleasant and cooperative  HEENT: Normocephalic, atraumatic, mucus membranes moist.  Eyes: EOMI  Neck:  Trachea midline.    Chest: Symmetric chest expansion, CTA bilaterally   CV:  Regular rate, regular rhythm.  Positive S1/S2.  Abdomen: soft, non-tender, non-distended, no guarding or peritoneal signs   Extremities:  No lower extremity edema  Neurological:  no obvious focal deficits   Psych: appropriate affect and behavior   Skin:  Warm and dry       Medications     Scheduled medications  Scheduled Medications[1]  Continuous medications  Continuous Medications[2]  PRN medications  PRN Medications[3]        Assessment / Plan   dalia Aldridge is a 69 y.o. female with a history of hypertension, tobacco abuse, alcohol abuse who presented to [facility] complaining of nausea, loss of appetite and right upper quadrant pain.       #Multiple liver masses, concern for metastatic disease  #Multiple pulmonary nodules with subcarinal adenopathy  #Cirrhosis  #Portal vein thrombosis  #Splenomegaly  #Anasarca  #Hyperbilirubinemia  #Renal angiomyolipoma  - Patient underwent biopsy of her liver, she will need to set up a follow-up appointment to discuss the results of her biopsy.  She will also need an outpatient PET scan.     This is a preliminary note written by the resident. Please wait for attending addendum for finalization of note and recommendations.     Dr. Burt Roach  Internal Medicine                [1] apixaban, 5 mg, oral, BID  famotidine, 20 mg, oral, q AM  lisinopril, 20 mg, oral, Daily  oxygen, , inhalation, Continuous - Inhalation  polyethylene glycol, 17 g, oral, Daily     [2]    [3] PRN medications: HYDROmorphone, " HYDROmorphone, melatonin, ondansetron, ondansetron ODT

## 2025-07-11 LAB
BACTERIA FLD CULT: NORMAL
GRAM STN SPEC: NORMAL
GRAM STN SPEC: NORMAL

## 2025-07-14 DIAGNOSIS — C80.1 CANCER OF UNKNOWN ORIGIN (MULTI): Primary | ICD-10-CM

## 2025-07-16 NOTE — PROGRESS NOTES
NAME: Lore Aldridge  AGE:  69 y.o.     Last seen:  7.9.2025 during hospitalization      HISTORY  This is a 61-year-old female with past medical history of hypertension, smoking, weekly marijuana use alcohol abuse who was recently admitted at [facility] for abdominal pain and bloating.  Had a CT scan done on 7/6/2025 which showed multiple pulmonary nodules consistent with metastatic disease subcarinal lymphadenopathy, nodular liver with hypertrophy of left lobe and multiple liver masses consistent with metastatic disease or hepatocellular carcinoma.  Large ascites.  During hospitalization she underwent paracentesis and had 3.9 L of ascitic fluid removed there is no SBP.  For portal vein thrombosis she was started on Eliquis.  Underwent IR guided liver biopsy which was positive for poorly differentiated carcinoma involving the liver.  The tumor profile argues against HCC and neuroendocrine tumor and most consistent with pancreatic biliary or upper GI malignancy.    Today patient is here with her .  Patient complains of abdominal pain and feels like fluid is building up again in her abdomen.  Also help assessment feels like she is slightly confused and her mentation is not at her normal level..  Also has lower extremity swelling but denies any chest pain or shortness of breath.     She is scheduled to have a PET scan done soon and has appointment with Dr. Rutherford in few days.        PERTINENT PRIOR DIAGNOSTIC TESTING  CT abdominal pelvis 7/6/2025  CHEST:  1.  Multiple pulmonary nodules consistent with metastatic disease.  2. Subcarinal adenopathy. Prominent right paratracheal and right  hilar lymph nodes.      ABDOMEN AND PELVIS:  1.  Nodularity of the contour of the liver with hypertrophy of the  left lobe consistent with hepatocellular disease and cirrhosis.  2. Multiple hypodense and hyperdense masses in the liver consistent  with metastatic disease or multicentric hepatocellular carcinoma.  3. Enlarged spleen.  4.  "Portal vein thrombosis.  5. Gastrohepatic, peripancreatic, subhash hepatis and retroperitoneal  adenopathy. Some of the lymph nodes are necrotic.  6. Large amount of ascites. Possible anasarca/hypoproteinemia. Subtle  nodularity of the peritoneal lining consistent with tumoral studding.  7. 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  8. 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  9. Sigmoid diverticula with no diverticulitis.  10. Cholelithiasis.         MEDICATIONS  Medications ordered prior to the current encounter[1]      ALLERGIES  Allergies[2]      PAST MEDICAL HISTORY  Medical History[3]                  PAST SURGICAL HISTORY  Surgical History[4]        GASTROINTESTINAL REVIEW OF SYSTEMS  Per HPI rest 12 point review of system negative    PHYSICAL EXAMINATION  /74 (BP Location: Left arm, Patient Position: Sitting)   Pulse 92   Ht 1.6 m (5' 3\")   Wt 76.9 kg (169 lb 9.6 oz)   BMI 30.04 kg/m²   General appearance: NAD  Oropharynx: MMM  Eyes: No scleral icterus  Lungs: Clear to auscultation  Heart: Regular rate and rhythm  Abdomen: Soft, distended, nontender, bowel sounds positive in all 4 quadrants  Extremities: No edema  Neuro: Grossly normal, no asterixis      Assessment   IMPRESSION  69-year-old female with past medical history of hypertension, smoking, marijuana use, alcohol use was recently admitted at [facility] for abdominal pain and bloating found to have metastatic disease of unknown origin here for follow-up    Metastatic disease of unknown origin  Multiple liver, lung, mediastinal abdominal adrenal metastatic lesions  Liver biopsy showed poorly differentiated carcinoma, less likely to be liver primary  Patient is scheduled to have a PET scan done and follow-up with oncology soon  She never had an EGD and colonoscopy done we will schedule her for that  Also will check AFP, CA 19-9 and CEA    Liver cirrhosis, from alcohol   decompensated    MELD 3.0: 16 at 7/8/2025  7:22 AM  MELD-Na: " 11 at 7/8/2025  7:22 AM  Calculated from:  Serum Creatinine: 0.48 mg/dL (Using min of 1 mg/dL) at 7/8/2025  7:22 AM  Serum Sodium: 133 mmol/L at 7/8/2025  7:22 AM  Total Bilirubin: 1.6 mg/dL at 7/8/2025  7:22 AM  Serum Albumin: 3 g/dL at 7/8/2025  7:22 AM  INR(ratio): 1.3 at 7/6/2025  4:14 PM  Age at listing (hypothetical): 69 years  Sex: Female at 7/8/2025  7:22 AM  Not a transplant candidate due to metastatic disease    Portal vein thrombosis  Continue Eliquis    Ascites    3.9 L removed during recent hospitalization, negative for SBP  Reaccumulation of fluid  I will put a standing order for biweekly paracentesis  Also stop her lisinopril and start her on Lasix 20 and spironolactone 40 mg daily  Low-sodium diet    Hepatic encephalopathy  No asterixis on my exam  Per  patient has been slightly confused and is not sleeping well which could be a sign of early hepatic encephalopathy  Will start her on lactulose 20 g twice daily to titrate for 3-4 bowel movements per day    Esophageal varices screening  Never had an EGD will schedule one     Follow-up in 4 to 6 weeks    Timbo Horton MD         [1]   Current Outpatient Medications   Medication Sig Dispense Refill    multivitamin with minerals (HAIR,SKIN AND NAILS ORAL) Take 1 tablet by mouth 3 (three) times a week.      apixaban (Eliquis) 5 mg tablet Take 1 tablet (5 mg) by mouth 2 times a day. 60 tablet 3    famotidine (Pepcid) 20 mg tablet Take 1 tablet (20 mg) by mouth once daily in the morning. (Patient not taking: Reported on 7/18/2025)      furosemide (Lasix) 20 mg tablet Take 2 tablets (40 mg) by mouth once daily. 30 tablet 3    lactulose 20 gram/30 mL oral solution Take 30 mL (20 g) by mouth 2 times a day. 1800 mL 2    ondansetron (Zofran) 4 mg tablet Take 1 tablet (4 mg) by mouth every 8 hours if needed for nausea or vomiting. (Patient not taking: Reported on 7/17/2025) 20 tablet 0    spironolactone (Aldactone) 50 mg tablet Take 1 tablet (50 mg) by  mouth once daily. (Patient not taking: Reported on 7/18/2025) 30 tablet 11     No current facility-administered medications for this visit.   [2] No Known Allergies  [3]   Past Medical History:  Diagnosis Date    Arthritis     Cancer (Multi)     Hypertension    [4]   Past Surgical History:  Procedure Laterality Date    REVERSE TOTAL SHOULDER ARTHROPLASTY Left     US SHOULDER LEFT Left

## 2025-07-17 ENCOUNTER — OFFICE VISIT (OUTPATIENT)
Dept: GASTROENTEROLOGY | Facility: CLINIC | Age: 70
End: 2025-07-17
Payer: COMMERCIAL

## 2025-07-17 VITALS
BODY MASS INDEX: 30.05 KG/M2 | DIASTOLIC BLOOD PRESSURE: 74 MMHG | HEIGHT: 63 IN | SYSTOLIC BLOOD PRESSURE: 162 MMHG | HEART RATE: 92 BPM | WEIGHT: 169.6 LBS

## 2025-07-17 DIAGNOSIS — C78.7 METASTASIS TO LIVER (MULTI): ICD-10-CM

## 2025-07-17 DIAGNOSIS — C22.0 HEPATOCELLULAR CARCINOMA: Primary | ICD-10-CM

## 2025-07-17 DIAGNOSIS — R18.8 OTHER ASCITES: ICD-10-CM

## 2025-07-17 DIAGNOSIS — G93.40 ENCEPHALOPATHY, UNSPECIFIED TYPE: ICD-10-CM

## 2025-07-17 DIAGNOSIS — I81 PORTAL VEIN THROMBOSIS: ICD-10-CM

## 2025-07-17 LAB
LAB AP ASR DISCLAIMER: NORMAL
LABORATORY COMMENT REPORT: NORMAL
PATH REPORT.ADDENDUM SPEC: NORMAL
PATH REPORT.FINAL DX SPEC: NORMAL
PATH REPORT.GROSS SPEC: NORMAL
PATH REPORT.RELEVANT HX SPEC: NORMAL
PATH REPORT.TOTAL CANCER: NORMAL

## 2025-07-17 PROCEDURE — 1111F DSCHRG MED/CURRENT MED MERGE: CPT | Performed by: INTERNAL MEDICINE

## 2025-07-17 PROCEDURE — 3078F DIAST BP <80 MM HG: CPT | Performed by: INTERNAL MEDICINE

## 2025-07-17 PROCEDURE — 99214 OFFICE O/P EST MOD 30 MIN: CPT | Performed by: INTERNAL MEDICINE

## 2025-07-17 PROCEDURE — 3077F SYST BP >= 140 MM HG: CPT | Performed by: INTERNAL MEDICINE

## 2025-07-17 PROCEDURE — 1159F MED LIST DOCD IN RCRD: CPT | Performed by: INTERNAL MEDICINE

## 2025-07-17 PROCEDURE — 3008F BODY MASS INDEX DOCD: CPT | Performed by: INTERNAL MEDICINE

## 2025-07-17 RX ORDER — SPIRONOLACTONE 50 MG/1
50 TABLET, FILM COATED ORAL DAILY
Qty: 30 TABLET | Refills: 11 | Status: SHIPPED | OUTPATIENT
Start: 2025-07-17 | End: 2026-07-17

## 2025-07-17 RX ORDER — LACTULOSE 10 G/15ML
20 SOLUTION ORAL 2 TIMES DAILY
Qty: 1800 ML | Refills: 2 | Status: SHIPPED | OUTPATIENT
Start: 2025-07-17

## 2025-07-17 RX ORDER — FUROSEMIDE 20 MG/1
40 TABLET ORAL DAILY
Qty: 30 TABLET | Refills: 3 | Status: SHIPPED | OUTPATIENT
Start: 2025-07-17 | End: 2025-07-24

## 2025-07-17 NOTE — PATIENT INSTRUCTIONS
Paracentesis every 2 weeks as needed  Stop lisinopril  Start lasix 20 mg once daily and spironolactone 50 mg daily  Start lactulose twice daily with goal to have 3-4 bowel movements per day  Blood work

## 2025-07-18 ENCOUNTER — APPOINTMENT (OUTPATIENT)
Dept: RADIOLOGY | Facility: HOSPITAL | Age: 70
End: 2025-07-18
Payer: COMMERCIAL

## 2025-07-18 ENCOUNTER — HOSPITAL ENCOUNTER (OUTPATIENT)
Dept: RADIOLOGY | Facility: HOSPITAL | Age: 70
Discharge: HOME | End: 2025-07-18
Payer: COMMERCIAL

## 2025-07-18 VITALS
BODY MASS INDEX: 30.36 KG/M2 | RESPIRATION RATE: 18 BRPM | DIASTOLIC BLOOD PRESSURE: 53 MMHG | WEIGHT: 165 LBS | TEMPERATURE: 97.9 F | OXYGEN SATURATION: 98 % | HEIGHT: 62 IN | HEART RATE: 90 BPM | SYSTOLIC BLOOD PRESSURE: 103 MMHG

## 2025-07-18 DIAGNOSIS — K70.31 ALCOHOLIC CIRRHOSIS OF LIVER WITH ASCITES (MULTI): ICD-10-CM

## 2025-07-18 PROCEDURE — 7100000010 HC PHASE TWO TIME - EACH INCREMENTAL 1 MINUTE

## 2025-07-18 PROCEDURE — 49083 ABD PARACENTESIS W/IMAGING: CPT | Mod: RT | Performed by: NURSE PRACTITIONER

## 2025-07-18 PROCEDURE — 2720000007 HC OR 272 NO HCPCS

## 2025-07-18 PROCEDURE — 2500000004 HC RX 250 GENERAL PHARMACY W/ HCPCS (ALT 636 FOR OP/ED): Performed by: NURSE PRACTITIONER

## 2025-07-18 PROCEDURE — 7100000009 HC PHASE TWO TIME - INITIAL BASE CHARGE

## 2025-07-18 RX ORDER — LIDOCAINE HYDROCHLORIDE 10 MG/ML
INJECTION, SOLUTION EPIDURAL; INFILTRATION; INTRACAUDAL; PERINEURAL
Status: COMPLETED | OUTPATIENT
Start: 2025-07-18 | End: 2025-07-18

## 2025-07-18 RX ADMIN — LIDOCAINE HYDROCHLORIDE 10 ML: 10 INJECTION, SOLUTION EPIDURAL; INFILTRATION; INTRACAUDAL; PERINEURAL at 08:40

## 2025-07-18 ASSESSMENT — PAIN SCALES - GENERAL
PAINLEVEL_OUTOF10: 0 - NO PAIN

## 2025-07-18 ASSESSMENT — PAIN - FUNCTIONAL ASSESSMENT
PAIN_FUNCTIONAL_ASSESSMENT: 0-10
PAIN_FUNCTIONAL_ASSESSMENT: 0-10

## 2025-07-18 NOTE — POST-PROCEDURE NOTE
Interventional Radiology Brief Postprocedure Note    Procedure: Paracentesis    Provider: JAVID Jones    Assistant: None    Diagnosis: Ascites    Description of procedure: A time out was performed and Right Hemiabdomen was examined with US and appropriate entry point was confirmed and marked.  The patient was prepped and draped in a sterile manner, 1% lidocaine was used to anesthesize the skin and subcutaneous tissue. A 5F Centesis needle was then introduced through the skin into the peritoneal space, the centesis catheter was then threaded without difficulty. 3250 ml of yellow fluid was removed without difficulty. The catheter was then removed.  No immediate complications were noted during and immediately following the procedure.          Medications (Filter: Administrations occurring from 0830 to 0854 on 07/18/25) As of 07/18/25 0854      lidocaine PF (Xylocaine) 10 mg/mL (1 %) injection (mL) Total volume:  10 mL      Date/Time Rate/Dose/Volume Action       07/18/25  0840 10 mL Given                     Complications: None    Estimated Blood Loss: none        See detailed result report with images in PACS.    The patient tolerated the procedure well without incident or complication and is in stable condition.

## 2025-07-18 NOTE — Clinical Note
Procedure end time. Paracentesis completed and 3250 ml yellow fluid removed with no complications/issues. Patient tolerated procedure well, denies pain/cramping. Right sided entry site free from bleeding/hematoma, dressed with Opsite dressing.

## 2025-07-18 NOTE — Clinical Note
Report called to OPS. Patient taken back to [facility] by this RN for bedside report with site inspection, recovery and discharge.

## 2025-07-21 ENCOUNTER — TELEPHONE (OUTPATIENT)
Dept: GASTROENTEROLOGY | Facility: CLINIC | Age: 70
End: 2025-07-21
Payer: COMMERCIAL

## 2025-07-21 DIAGNOSIS — Z12.11 COLON CANCER SCREENING: Primary | ICD-10-CM

## 2025-07-21 RX ORDER — SODIUM, POTASSIUM,MAG SULFATES 17.5-3.13G
SOLUTION, RECONSTITUTED, ORAL ORAL
Qty: 354 ML | Refills: 0 | Status: SHIPPED | OUTPATIENT
Start: 2025-07-21

## 2025-07-22 ENCOUNTER — APPOINTMENT (OUTPATIENT)
Dept: GASTROENTEROLOGY | Facility: HOSPITAL | Age: 70
End: 2025-07-22
Payer: COMMERCIAL

## 2025-07-23 ENCOUNTER — HOSPITAL ENCOUNTER (OUTPATIENT)
Dept: RADIOLOGY | Facility: CLINIC | Age: 70
Discharge: HOME | End: 2025-07-23
Payer: COMMERCIAL

## 2025-07-23 ENCOUNTER — TELEPHONE (OUTPATIENT)
Dept: GASTROENTEROLOGY | Facility: CLINIC | Age: 70
End: 2025-07-23

## 2025-07-23 DIAGNOSIS — C80.1 CANCER OF UNKNOWN ORIGIN (MULTI): ICD-10-CM

## 2025-07-23 LAB — GLUCOSE BLD MANUAL STRIP-MCNC: 105 MG/DL (ref 74–99)

## 2025-07-23 PROCEDURE — 78816 PET IMAGE W/CT FULL BODY: CPT | Mod: PI

## 2025-07-23 PROCEDURE — A9552 F18 FDG: HCPCS | Performed by: INTERNAL MEDICINE

## 2025-07-23 PROCEDURE — 3430000001 HC RX 343 DIAGNOSTIC RADIOPHARMACEUTICALS: Performed by: INTERNAL MEDICINE

## 2025-07-23 PROCEDURE — 82947 ASSAY GLUCOSE BLOOD QUANT: CPT

## 2025-07-23 RX ORDER — FLUDEOXYGLUCOSE F 18 200 MCI/ML
12.54 INJECTION, SOLUTION INTRAVENOUS
Status: COMPLETED | OUTPATIENT
Start: 2025-07-23 | End: 2025-07-23

## 2025-07-23 RX ADMIN — FLUDEOXYGLUCOSE F 18 12.54 MILLICURIE: 200 INJECTION, SOLUTION INTRAVENOUS at 10:08

## 2025-07-23 NOTE — TELEPHONE ENCOUNTER
Patients  dropped off [facility] paperwork to be filled out by Dr. Horton. Papers were handed to Dr. Clifford Tyson's Medical Assistant.

## 2025-07-24 ENCOUNTER — APPOINTMENT (OUTPATIENT)
Dept: GASTROENTEROLOGY | Facility: CLINIC | Age: 70
End: 2025-07-24
Payer: COMMERCIAL

## 2025-07-24 DIAGNOSIS — R18.8 OTHER ASCITES: ICD-10-CM

## 2025-07-24 RX ORDER — FUROSEMIDE 20 MG/1
40 TABLET ORAL DAILY
Qty: 180 TABLET | Refills: 1 | Status: SHIPPED | OUTPATIENT
Start: 2025-07-24

## 2025-07-28 ENCOUNTER — OFFICE VISIT (OUTPATIENT)
Dept: HEMATOLOGY/ONCOLOGY | Facility: CLINIC | Age: 70
End: 2025-07-28
Payer: COMMERCIAL

## 2025-07-28 ENCOUNTER — SOCIAL WORK (OUTPATIENT)
Dept: HEMATOLOGY/ONCOLOGY | Facility: CLINIC | Age: 70
End: 2025-07-28
Payer: COMMERCIAL

## 2025-07-28 VITALS
RESPIRATION RATE: 20 BRPM | HEART RATE: 115 BPM | HEIGHT: 61 IN | WEIGHT: 165.57 LBS | OXYGEN SATURATION: 95 % | TEMPERATURE: 96.8 F | DIASTOLIC BLOOD PRESSURE: 74 MMHG | SYSTOLIC BLOOD PRESSURE: 120 MMHG | BODY MASS INDEX: 31.26 KG/M2

## 2025-07-28 DIAGNOSIS — C79.9 METASTATIC CARCINOMA: Primary | ICD-10-CM

## 2025-07-28 PROCEDURE — 3078F DIAST BP <80 MM HG: CPT | Performed by: INTERNAL MEDICINE

## 2025-07-28 PROCEDURE — 99215 OFFICE O/P EST HI 40 MIN: CPT | Performed by: INTERNAL MEDICINE

## 2025-07-28 PROCEDURE — 1111F DSCHRG MED/CURRENT MED MERGE: CPT | Performed by: INTERNAL MEDICINE

## 2025-07-28 PROCEDURE — 1159F MED LIST DOCD IN RCRD: CPT | Performed by: INTERNAL MEDICINE

## 2025-07-28 PROCEDURE — 3074F SYST BP LT 130 MM HG: CPT | Performed by: INTERNAL MEDICINE

## 2025-07-28 PROCEDURE — 3008F BODY MASS INDEX DOCD: CPT | Performed by: INTERNAL MEDICINE

## 2025-07-28 PROCEDURE — 1125F AMNT PAIN NOTED PAIN PRSNT: CPT | Performed by: INTERNAL MEDICINE

## 2025-07-28 RX ORDER — OXYCODONE HYDROCHLORIDE 5 MG/1
5 TABLET ORAL EVERY 6 HOURS PRN
Qty: 60 TABLET | Refills: 0 | Status: SHIPPED | OUTPATIENT
Start: 2025-07-28 | End: 2025-08-27

## 2025-07-28 RX ORDER — MIRTAZAPINE 15 MG/1
15 TABLET, FILM COATED ORAL NIGHTLY
Qty: 30 TABLET | Refills: 3 | Status: SHIPPED | OUTPATIENT
Start: 2025-07-28

## 2025-07-28 RX ORDER — ONDANSETRON 8 MG/1
8 TABLET, FILM COATED ORAL EVERY 8 HOURS PRN
Qty: 30 TABLET | Refills: 2 | Status: SHIPPED | OUTPATIENT
Start: 2025-07-28

## 2025-07-28 RX ORDER — PROCHLORPERAZINE MALEATE 10 MG
10 TABLET ORAL EVERY 6 HOURS PRN
Qty: 30 TABLET | Refills: 2 | Status: SHIPPED | OUTPATIENT
Start: 2025-07-28

## 2025-07-28 ASSESSMENT — PATIENT HEALTH QUESTIONNAIRE - PHQ9
1. LITTLE INTEREST OR PLEASURE IN DOING THINGS: NOT AT ALL
2. FEELING DOWN, DEPRESSED OR HOPELESS: NOT AT ALL
SUM OF ALL RESPONSES TO PHQ9 QUESTIONS 1 AND 2: 0

## 2025-07-28 ASSESSMENT — ENCOUNTER SYMPTOMS: OCCASIONAL FEELINGS OF UNSTEADINESS: 1

## 2025-07-28 ASSESSMENT — PAIN SCALES - GENERAL: PAINLEVEL_OUTOF10: 10-WORST PAIN EVER

## 2025-07-28 ASSESSMENT — COLUMBIA-SUICIDE SEVERITY RATING SCALE - C-SSRS
1. IN THE PAST MONTH, HAVE YOU WISHED YOU WERE DEAD OR WISHED YOU COULD GO TO SLEEP AND NOT WAKE UP?: NO
2. HAVE YOU ACTUALLY HAD ANY THOUGHTS OF KILLING YOURSELF?: NO

## 2025-07-28 NOTE — PROGRESS NOTES
Patient ID: : Lore Aldridge            Primary Care Provider: Candida Ch MD    LOCATION:  [facility]    REFERRING PHYSICIAN:  Alla Aguilera MD    HEMATOLOGY ONCOLOGY PROBLEMS:  1.  Widely metastatic pancreaticobiliary carcinoma.    CHIEF COMPLAINTS:  Patient is in the clinic today for evaluation of metastatic pancreaticobiliary carcinoma.    HISTORY:  Lore Aldridge is a 69 y.o. female with pancreaticobiliary carcinoma.  She was admitted at [facility] in July 2025 with history of worsening nausea, right upper quadrant pain, abdominal distention and progressive weight loss.  CT scan showed multiple liver and pulmonary lesions consistent with metastatic disease along with cirrhosis, portal vein thrombosis, splenomegaly, diffuse lymphadenopathy and a necrotic right adrenal lesion.  She was started on heparin and also had a diagnostic/therapeutic paracentesis by interventional radiology.  Ultrasound-guided biopsy of the liver lesion from 7/9/2025 confirmed poorly differentiated carcinoma with immunohistochemistry stain suggestive of pancreaticobiliary origin.  Post discharge follow-up PET scan from 7/23/2025 confirmed liver, lung lesions along with diffuse lymphadenopathy along with additional findings of bone, soft tissue and most probable cardiac metastatic lesion.    INTERVAL HISTORY:  She is having issues with progressive functional decline.  Abdomen is distended again.  Having problem with nausea, diarrhea, abdominal pain and generalized weakness.    PAST MEDICAL HISTORY:  1.  Metastatic pancreaticobiliary cancer as detailed above.  2.  Hypertension  3.  DJD  4.  History of tobacco and alcohol abuse    SOCIAL HISTORY:   for 29 years has lives in [place].  Quit smoking recently.  1 pack a day for 25 years prior smoking history.  Quit drinking recently.  Admit to moderate to heavy alcohol use for long time.  She used to work as a  for a car dealership.   "Born and raised in [place].    FAMILY HISTORY:  Father  at age 90 from dementia related problems.  Mother  at age 86 from lung cancer.  3 siblings.  1  from alcohol related issues.  2 sons ~ alive and well.  No other family history of bleeding, clotting or malignant disorders in the family history.    REVIEW OF SYSTEMS:   Pertinent finding as per the history above.  All other systems have been reviewed and generally negative and noncontributory.    VITAL SIGNS  BSA: 1.8 meters squared  /74   Pulse (!) 115   Temp 36 °C (96.8 °F)   Resp 20   Ht (S) (!) 1.547 m (5' 0.91\")   Wt 75.1 kg (165 lb 9.1 oz)   LMP  (Exact Date)   SpO2 95%   BMI 31.38 kg/m²     PHYSICAL EXAMINATION:  Detailed physical examination not done.    LAB DATA:  CBC was unremarkable on 7/10/2025 other than platelets of 93.  Metabolic profile showed a sodium of 131 with alkaline phosphatase of 171, AST of 72 and total bilirubin of 1.5.  AFP was normal.    RADIOLOGICAL DATA:  PET scan 2025   IMPRESSION:  1. Hypermetabolic, heterogeneous, infiltrative, ill-defined right hepatic mass consistent with neoplastic process such as cholangiocarcinoma  2. Multiple metastatic bilateral pulmonary nodules  3. Multiple metastatic bilateral mediastinal, hilar, abdominal, retroperitoneal metastatic lymphadenopathy  4. Single metastatic bone lesion in upper thoracic spine and metastatic muscle implant within right posterior arm  5. Large focus of intense FDG uptake in vicinity of right atrium concerning for cardiac metastases, this could be also large metastatic nodes, further evaluation with cardiac MRI may be of value to exclude cardiac metastases    CT angio chest, CT abdomen/pelvis 2025  Impression:  CHEST:  1.  Multiple pulmonary nodules consistent with metastatic disease.  2. Subcarinal adenopathy. Prominent right paratracheal and right hilar lymph nodes.      ABDOMEN AND PELVIS:  1.  Nodularity of the contour of the liver with " hypertrophy of the left lobe consistent with hepatocellular disease and cirrhosis.   2. Multiple hypodense and hyperdense masses in the liver consistent with metastatic disease or multicentric hepatocellular carcinoma.   3. Enlarged spleen.  4. Portal vein thrombosis.  5. Gastrohepatic, peripancreatic, subhash hepatis and retroperitoneal adenopathy. Some of the lymph nodes are necrotic.  6. Large amount of ascites. Possible anasarca/hypoproteinemia. Subtle nodularity of the peritoneal lining consistent with tumoral studding.  7. 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  8. 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  9. Sigmoid diverticula with no diverticulitis.  10. Cholelithiasis.    ASSESSMENT / PLAN:  1.  Widely metastatic pancreaticobiliary carcinoma.  Please refer the details of initial presentation and management as outlined above.  In summary, patient with few months history of progressively worsening weakness, fatigue, ascites, abdominal pain with further workup revealing extensive metastatic disease with liver, pulmonary, bone, soft tissue, peritoneum, lymph node and most probable cardiac involvement with metastatic disease.  Additionally there was finding of portal vein thrombosis.  Liver lesion biopsy results are confirmatory of primary pancreaticobiliary origin.    Long discussion with the patient and family members and they were explained about the diagnosis, extent of the metastatic disease and overall prognosis.  We reviewed the option of systemic therapy with combination of gemcitabine/cisplatin plus immunotherapy versus upfront palliative care with hospice intent.  As expected, her overall prognosis is extremely guarded especially considering the baseline histology and the volume of the metastatic disease.  Patient and family member will review all the options and will let us know about their decision and we will plan further management accordingly.  In the meantime, we will try to start maximum  supportive care for better management of nausea, pain, diarrhea, ascites if possible we will try to connect her with palliative care team.  She is already scheduled for paracentesis tomorrow and we will also start her on oxycodone, Zofran, Compazine, Lasix, Pepcid for supportive care.  She will continue with the Eliquis for now.    A total of 60 minutes were spent in evaluation - performing physical examination, history taking, review of the previous extensive records/information and formulating current and future management plan. Majority of the time was spend in consultation and discussion.    This note has been transcribed using Dragon voice recognition system and there is a possibility of unintentional typing misprints.

## 2025-07-28 NOTE — PROGRESS NOTES
Pt completed the distress screening today and noted having worry/anxiety and fear. Pt worried about being able to take care of herself and the loss of her physical abilities. Met with pt, , sister and pt's two sons in the room. Pt just learned today of her extensive metastatic cancer diagnosis. Pt is trying to process what she has learned today and is very overwhelmed. Sat down to answer questions to try and offer support and assist pt and family with all of their questions and concerns. Asked RN partner Tierra to get a handicap placard order signed and also let Tierra know they wanted a list of new meds MD would be ordering to help pt deal with her pain and discomfort. Pt had abd swelling and did not feel much like eating and was extremely weak. Pt was working but had not been there in awhile due to how she was feeling. Pt brought in FMLA paperwork but requested pt's  to speak to HR at pt's work to see if she had any vacation or sick time she could still take with FMLA leave. Pt is certain she will not be able to return and will retire. Pt was not expecting this large change or this diagnosis. Pt and family will speak about palliative care vs possible treatment and what they want to do moving forward. They will call RN partner tomorrow with decision and also let [employer] L[employer] know if pt has any time left to claim FMLA days. Inquired if pt had short or long term disability through work. Pt indicated she did not. Offered to meet with family further if needed or be available to speak on the phone with them to have more discussion or answer any other questions or concerns for them. They are going to go home and think about things and let us know. Let  and sons know if they needed FMLA paperwork completed they could have it emailed to [employer] JASONW[employer] which each of them had contact information.

## 2025-07-29 ENCOUNTER — HOSPITAL ENCOUNTER (OUTPATIENT)
Dept: RADIOLOGY | Facility: HOSPITAL | Age: 70
Discharge: HOME | End: 2025-07-29
Payer: COMMERCIAL

## 2025-07-29 VITALS
HEIGHT: 61 IN | RESPIRATION RATE: 16 BRPM | DIASTOLIC BLOOD PRESSURE: 56 MMHG | OXYGEN SATURATION: 98 % | WEIGHT: 165 LBS | HEART RATE: 97 BPM | TEMPERATURE: 98.1 F | BODY MASS INDEX: 31.15 KG/M2 | SYSTOLIC BLOOD PRESSURE: 117 MMHG

## 2025-07-29 DIAGNOSIS — K70.31 ALCOHOLIC CIRRHOSIS OF LIVER WITH ASCITES (MULTI): ICD-10-CM

## 2025-07-29 PROCEDURE — P9047 ALBUMIN (HUMAN), 25%, 50ML: HCPCS | Performed by: NURSE PRACTITIONER

## 2025-07-29 PROCEDURE — 7100000009 HC PHASE TWO TIME - INITIAL BASE CHARGE

## 2025-07-29 PROCEDURE — 7100000010 HC PHASE TWO TIME - EACH INCREMENTAL 1 MINUTE

## 2025-07-29 PROCEDURE — 2720000007 HC OR 272 NO HCPCS

## 2025-07-29 PROCEDURE — 49083 ABD PARACENTESIS W/IMAGING: CPT | Mod: RT | Performed by: NURSE PRACTITIONER

## 2025-07-29 PROCEDURE — 2500000004 HC RX 250 GENERAL PHARMACY W/ HCPCS (ALT 636 FOR OP/ED): Performed by: NURSE PRACTITIONER

## 2025-07-29 RX ORDER — LIDOCAINE HYDROCHLORIDE 10 MG/ML
INJECTION, SOLUTION EPIDURAL; INFILTRATION; INTRACAUDAL; PERINEURAL
Status: COMPLETED | OUTPATIENT
Start: 2025-07-29 | End: 2025-07-29

## 2025-07-29 RX ORDER — ALBUMIN HUMAN 250 G/1000ML
25 SOLUTION INTRAVENOUS ONCE
Status: COMPLETED | OUTPATIENT
Start: 2025-07-29 | End: 2025-07-29

## 2025-07-29 RX ADMIN — LIDOCAINE HYDROCHLORIDE 8 ML: 10 INJECTION, SOLUTION EPIDURAL; INFILTRATION; INTRACAUDAL; PERINEURAL at 08:41

## 2025-07-29 RX ADMIN — ALBUMIN HUMAN 25 G: 0.25 SOLUTION INTRAVENOUS at 09:40

## 2025-07-29 ASSESSMENT — PAIN SCALES - GENERAL
PAINLEVEL_OUTOF10: 0 - NO PAIN
PAINLEVEL_OUTOF10: 7
PAINLEVEL_OUTOF10: 0 - NO PAIN

## 2025-07-29 ASSESSMENT — PAIN - FUNCTIONAL ASSESSMENT: PAIN_FUNCTIONAL_ASSESSMENT: 0-10

## 2025-07-29 ASSESSMENT — COLUMBIA-SUICIDE SEVERITY RATING SCALE - C-SSRS
1. IN THE PAST MONTH, HAVE YOU WISHED YOU WERE DEAD OR WISHED YOU COULD GO TO SLEEP AND NOT WAKE UP?: NO
2. HAVE YOU ACTUALLY HAD ANY THOUGHTS OF KILLING YOURSELF?: NO
6. HAVE YOU EVER DONE ANYTHING, STARTED TO DO ANYTHING, OR PREPARED TO DO ANYTHING TO END YOUR LIFE?: NO

## 2025-07-29 NOTE — POST-PROCEDURE NOTE
Interventional Radiology Brief Postprocedure Note    Procedure: Paracentesis    Provider: OSMAR Jones-CNP    Assistant: None    Diagnosis: Ascites    Description of procedure: A time out was performed and Right Hemiabdomen was examined with US and appropriate entry point was confirmed and marked.  The patient was prepped and draped in a sterile manner, 1% lidocaine was used to anesthesize the skin and subcutaneous tissue. A 5F Centesis needle was then introduced through the skin into the peritoneal space, the centesis catheter was then threaded without difficulty. 5000 ml of yellow fluid was removed without difficulty. The catheter was then removed. Specimens labeled and sent to lab per primary team. 25 grams of albumin ordered to be administered. No immediate complications were noted during and immediately following the procedure.          Medications (Filter: Administrations occurring from 0831 to 0902 on 07/29/25) As of 07/29/25 0902      lidocaine PF (Xylocaine) 10 mg/mL (1 %) injection (mL) Total volume:  8 mL      Date/Time Rate/Dose/Volume Action       07/29/25  0841 8 mL Given                     Complications: None    Estimated Blood Loss: none        See detailed result report with images in PACS.    The patient tolerated the procedure well without incident or complication and is in stable condition.

## 2025-07-29 NOTE — Clinical Note
Paracentesis completed. 5 liters of clear serous ascites fluid removed. Sample sent to lab for cytology. Dressing applied to right side of abdomen. Pt to receive 25 gm albumin  post procedure.

## 2025-07-30 ENCOUNTER — APPOINTMENT (OUTPATIENT)
Dept: RADIOLOGY | Facility: HOSPITAL | Age: 70
End: 2025-07-30
Payer: COMMERCIAL

## 2025-07-31 ENCOUNTER — APPOINTMENT (OUTPATIENT)
Dept: GASTROENTEROLOGY | Facility: CLINIC | Age: 70
End: 2025-07-31
Payer: COMMERCIAL

## 2025-08-01 ENCOUNTER — TELEPHONE (OUTPATIENT)
Dept: HEMATOLOGY/ONCOLOGY | Facility: CLINIC | Age: 70
End: 2025-08-01
Payer: COMMERCIAL

## 2025-08-01 NOTE — TELEPHONE ENCOUNTER
Reason for Conversation  Question    Background   Pt's  Guy has a couple questions for you.   Asking for call back [phone]    Disposition   No disposition on file.

## 2025-08-01 NOTE — TELEPHONE ENCOUNTER
Phoned Guy back and answered questions regarding possible chemotherapy regimen. Per Dr. Rutherford he would offer her Carboplatin / Gemcitabine. Reviewed with  possible regimen dosing for 21 or 28 day cycles and possible / probable SE / AE.  They are still deciding on what they want to do and he will reach back out to me with questions or when a decision is made.

## 2025-08-04 ENCOUNTER — SOCIAL WORK (OUTPATIENT)
Dept: HEMATOLOGY/ONCOLOGY | Facility: CLINIC | Age: 70
End: 2025-08-04
Payer: COMMERCIAL

## 2025-08-04 NOTE — PROGRESS NOTES
Pt's  Guy called asking for palliative care to be set up at home for pt. They are requesting [facility] to come in and follow pt. Inquired about pt's FMLA and if it needs to be completed. Per  she retired and quit working and the paperwork does not need to be completed. Asked if there was anything else Onc LISW-S could assist them with at this time.  mentioned he would need FMLA paperwork completed and would send it over to [employer] LISW[facility]S when he needs it. All information and order ready to be signed by Dr. Rutherford tomorrow when he is back in the office. Will fax over info to [facility] tomorrow once order is signed.

## 2025-08-05 ENCOUNTER — APPOINTMENT (OUTPATIENT)
Dept: GASTROENTEROLOGY | Facility: HOSPITAL | Age: 70
End: 2025-08-05
Payer: COMMERCIAL

## 2025-08-05 NOTE — DOCUMENTATION CLARIFICATION NOTE
"    PATIENT:               ROYAL SHETH  ACCT #:                  [id number]  MRN:                       [id number]  :                       1955  ADMIT DATE:       2025 11:00 AM  DISCH DATE:        7/10/2025 1:44 PM  RESPONDING PROVIDER #:        [id number]          PROVIDER RESPONSE TEXT:    I concur with the 25 pathology report findings, and they are clinically significant    CDI QUERY TEXT:    Clarification        Instruction:    Based on your assessment of the patient and the clinical information, please provide the requested documentation by clicking on the appropriate radio button and enter any additional information if prompted.    Question: Please document whether you concur or do not concur with the pathology report findings    When answering this query, please exercise your independent professional judgment. The fact that a question is being asked, does not imply that any particular answer is desired or expected.    The patient's clinical indicators include:  Clinical Information:  69-year-old woman with history of alcohol abuse who presented to the hospital with abdominal bloating and ascites.    Clinical Indicators and Pathology Findings:  25 Pathology Report:  \"A. :  Liver Mass Biopsy Right    -- Poorly differentiated carcinoma involving liver, see note.    Note:  Immunohistochemistry demonstrates that the tumor cells are CK7-positive and negative for CK20, CDX2, TTF-1,  arginase, and INSM1.This profile argues against hepatocellular carcinoma and neuroendocrine tumor and is most  consistent with a pancreatobiliary or upper gastrointestinal primary, among others.Immunohistochemical studies for  mismatch repair proteins have been ordered.\"  Options provided:  -- I concur with the 25 pathology report findings, and they are clinically significant  -- Other - I will add my own diagnosis  -- Refer to Clinical Documentation Reviewer    Query created by: Joaquina Champion on 2025 5:23 " PM      Electronically signed by:  CAITLIN WANG MD 8/5/2025 7:06 AM

## 2025-08-06 ENCOUNTER — APPOINTMENT (OUTPATIENT)
Dept: RADIOLOGY | Facility: HOSPITAL | Age: 70
End: 2025-08-06
Payer: COMMERCIAL

## 2025-08-06 ENCOUNTER — TELEPHONE (OUTPATIENT)
Dept: HEMATOLOGY/ONCOLOGY | Facility: CLINIC | Age: 70
End: 2025-08-06

## 2025-08-06 ENCOUNTER — HOSPITAL ENCOUNTER (OUTPATIENT)
Dept: RADIOLOGY | Facility: HOSPITAL | Age: 70
Discharge: HOME | End: 2025-08-06
Payer: COMMERCIAL

## 2025-08-06 VITALS
SYSTOLIC BLOOD PRESSURE: 115 MMHG | DIASTOLIC BLOOD PRESSURE: 58 MMHG | HEIGHT: 61 IN | HEART RATE: 97 BPM | RESPIRATION RATE: 15 BRPM | WEIGHT: 165 LBS | TEMPERATURE: 97.7 F | OXYGEN SATURATION: 96 % | BODY MASS INDEX: 31.15 KG/M2

## 2025-08-06 DIAGNOSIS — K70.31 ALCOHOLIC CIRRHOSIS OF LIVER WITH ASCITES (MULTI): ICD-10-CM

## 2025-08-06 LAB
LAB AP ASR DISCLAIMER: NORMAL
LABORATORY COMMENT REPORT: NORMAL
LABORATORY COMMENT REPORT: NORMAL
PATH REPORT.COMMENTS IMP SPEC: NORMAL
PATH REPORT.FINAL DX SPEC: NORMAL
PATH REPORT.GROSS SPEC: NORMAL
PATH REPORT.RELEVANT HX SPEC: NORMAL
PATH REPORT.TOTAL CANCER: NORMAL

## 2025-08-06 PROCEDURE — 2720000007 HC OR 272 NO HCPCS

## 2025-08-06 PROCEDURE — 2500000004 HC RX 250 GENERAL PHARMACY W/ HCPCS (ALT 636 FOR OP/ED): Performed by: NURSE PRACTITIONER

## 2025-08-06 PROCEDURE — 49083 ABD PARACENTESIS W/IMAGING: CPT | Mod: RT | Performed by: NURSE PRACTITIONER

## 2025-08-06 PROCEDURE — 7100000009 HC PHASE TWO TIME - INITIAL BASE CHARGE

## 2025-08-06 PROCEDURE — 7100000010 HC PHASE TWO TIME - EACH INCREMENTAL 1 MINUTE

## 2025-08-06 PROCEDURE — P9047 ALBUMIN (HUMAN), 25%, 50ML: HCPCS | Performed by: NURSE PRACTITIONER

## 2025-08-06 RX ORDER — ALBUMIN HUMAN 250 G/1000ML
12.5 SOLUTION INTRAVENOUS ONCE
Status: COMPLETED | OUTPATIENT
Start: 2025-08-06 | End: 2025-08-06

## 2025-08-06 RX ORDER — ALBUMIN HUMAN 250 G/1000ML
25 SOLUTION INTRAVENOUS ONCE
Status: COMPLETED | OUTPATIENT
Start: 2025-08-06 | End: 2025-08-06

## 2025-08-06 RX ORDER — LIDOCAINE HYDROCHLORIDE 10 MG/ML
INJECTION, SOLUTION EPIDURAL; INFILTRATION; INTRACAUDAL; PERINEURAL
Status: COMPLETED | OUTPATIENT
Start: 2025-08-06 | End: 2025-08-06

## 2025-08-06 RX ADMIN — ALBUMIN HUMAN 25 G: 0.25 SOLUTION INTRAVENOUS at 09:34

## 2025-08-06 RX ADMIN — ALBUMIN HUMAN 12.5 G: 0.25 SOLUTION INTRAVENOUS at 10:39

## 2025-08-06 RX ADMIN — LIDOCAINE HYDROCHLORIDE 10 ML: 10 INJECTION, SOLUTION EPIDURAL; INFILTRATION; INTRACAUDAL; PERINEURAL at 08:45

## 2025-08-06 ASSESSMENT — PAIN SCALES - GENERAL
PAINLEVEL_OUTOF10: 5 - MODERATE PAIN
PAINLEVEL_OUTOF10: 5 - MODERATE PAIN
PAINLEVEL_OUTOF10: 1
PAINLEVEL_OUTOF10: 5 - MODERATE PAIN
PAINLEVEL_OUTOF10: 0 - NO PAIN
PAINLEVEL_OUTOF10: 7
PAINLEVEL_OUTOF10: 5 - MODERATE PAIN
PAINLEVEL_OUTOF10: 5 - MODERATE PAIN
PAINLEVEL_OUTOF10: 0 - NO PAIN
PAINLEVEL_OUTOF10: 5 - MODERATE PAIN
PAINLEVEL_OUTOF10: 1

## 2025-08-06 ASSESSMENT — PAIN - FUNCTIONAL ASSESSMENT
PAIN_FUNCTIONAL_ASSESSMENT: 0-10

## 2025-08-06 NOTE — POST-PROCEDURE NOTE
Interventional Radiology Brief Postprocedure Note    Procedure: Paracentesis    Provider: OSMAR Jones-CNP    Assistant: None    Diagnosis: Ascites    Description of procedure: A time out was performed and Right Hemiabdomen was examined with US and appropriate entry point was confirmed and marked.  The patient was prepped and draped in a sterile manner, 1% lidocaine was used to anesthesize the skin and subcutaneous tissue. A 5F Centesis needle was then introduced through the skin into the peritoneal space, the centesis catheter was then threaded without difficulty. 4500 ml of yellow fluid was removed without difficulty. The catheter was then removed.  25 grams of albumin ordered to be administered. No immediate complications were noted during and immediately following the procedure.          Medications (Filter: Administrations occurring from 0831 to 0901 on 08/06/25) As of 08/06/25 0901      lidocaine PF (Xylocaine) 10 mg/mL (1 %) injection (mL) Total volume:  10 mL      Date/Time Rate/Dose/Volume Action       08/06/25  0845 10 mL Given                     Complications: None    Estimated Blood Loss: none        See detailed result report with images in PACS.    The patient tolerated the procedure well without incident or complication and is in stable condition.

## 2025-08-06 NOTE — Clinical Note
Right paracentesis completed. Patient tolerated procedure well. 4,500 ml clear/yellow fluid drained. Site covered with occlussive dressing, no bleeding or hematoma noted. Report called to OPS nurse and patient placed for transport.

## 2025-08-07 DIAGNOSIS — C79.9 METASTATIC CARCINOMA: ICD-10-CM

## 2025-08-08 DIAGNOSIS — G93.40 ENCEPHALOPATHY, UNSPECIFIED TYPE: ICD-10-CM

## 2025-08-08 RX ORDER — LACTULOSE 10 G/15ML
20 SOLUTION ORAL; RECTAL 2 TIMES DAILY
Qty: 1800 ML | Refills: 2 | Status: SHIPPED | OUTPATIENT
Start: 2025-08-08 | End: 2025-09-07

## 2025-08-11 ENCOUNTER — SOCIAL WORK (OUTPATIENT)
Dept: HEMATOLOGY/ONCOLOGY | Facility: CLINIC | Age: 70
End: 2025-08-11
Payer: COMMERCIAL

## 2025-08-11 ENCOUNTER — TELEPHONE (OUTPATIENT)
Dept: HEMATOLOGY/ONCOLOGY | Facility: CLINIC | Age: 70
End: 2025-08-11
Payer: COMMERCIAL

## 2025-08-11 DIAGNOSIS — I81 PORTAL VEIN THROMBOSIS: ICD-10-CM

## 2025-08-12 ENCOUNTER — HOSPITAL ENCOUNTER (OUTPATIENT)
Dept: RADIOLOGY | Facility: HOSPITAL | Age: 70
Discharge: HOME | End: 2025-08-12
Payer: COMMERCIAL

## 2025-08-12 VITALS
DIASTOLIC BLOOD PRESSURE: 60 MMHG | RESPIRATION RATE: 12 BRPM | OXYGEN SATURATION: 93 % | HEART RATE: 83 BPM | SYSTOLIC BLOOD PRESSURE: 96 MMHG

## 2025-08-12 VITALS
TEMPERATURE: 97.1 F | HEIGHT: 61 IN | OXYGEN SATURATION: 97 % | RESPIRATION RATE: 13 BRPM | BODY MASS INDEX: 30.21 KG/M2 | HEART RATE: 93 BPM | DIASTOLIC BLOOD PRESSURE: 52 MMHG | SYSTOLIC BLOOD PRESSURE: 102 MMHG | WEIGHT: 160 LBS

## 2025-08-12 DIAGNOSIS — K70.31 ALCOHOLIC CIRRHOSIS OF LIVER WITH ASCITES (MULTI): ICD-10-CM

## 2025-08-12 DIAGNOSIS — C79.9 METASTATIC CARCINOMA: ICD-10-CM

## 2025-08-12 DIAGNOSIS — R18.0 MALIGNANT ASCITES: Primary | ICD-10-CM

## 2025-08-12 PROCEDURE — C1769 GUIDE WIRE: HCPCS

## 2025-08-12 PROCEDURE — 2720000007 HC OR 272 NO HCPCS

## 2025-08-12 PROCEDURE — 49418 INSERT TUN IP CATH PERC: CPT | Mod: RT | Performed by: RADIOLOGY

## 2025-08-12 PROCEDURE — 7100000009 HC PHASE TWO TIME - INITIAL BASE CHARGE

## 2025-08-12 PROCEDURE — C1729 CATH, DRAINAGE: HCPCS

## 2025-08-12 PROCEDURE — 7100000010 HC PHASE TWO TIME - EACH INCREMENTAL 1 MINUTE

## 2025-08-12 PROCEDURE — 2500000004 HC RX 250 GENERAL PHARMACY W/ HCPCS (ALT 636 FOR OP/ED): Performed by: RADIOLOGY

## 2025-08-12 PROCEDURE — C1751 CATH, INF, PER/CENT/MIDLINE: HCPCS

## 2025-08-12 RX ORDER — LIDOCAINE HYDROCHLORIDE AND EPINEPHRINE 10; 10 UG/ML; MG/ML
INJECTION, SOLUTION INFILTRATION; PERINEURAL
Status: COMPLETED | OUTPATIENT
Start: 2025-08-12 | End: 2025-08-12

## 2025-08-12 RX ADMIN — LIDOCAINE HYDROCHLORIDE,EPINEPHRINE BITARTRATE 10 ML: 10; .01 INJECTION, SOLUTION INFILTRATION; PERINEURAL at 09:40

## 2025-08-12 ASSESSMENT — PAIN SCALES - GENERAL
PAINLEVEL_OUTOF10: 0 - NO PAIN

## 2025-08-12 ASSESSMENT — PAIN - FUNCTIONAL ASSESSMENT: PAIN_FUNCTIONAL_ASSESSMENT: 0-10

## 2025-08-13 ENCOUNTER — APPOINTMENT (OUTPATIENT)
Dept: RADIOLOGY | Facility: HOSPITAL | Age: 70
End: 2025-08-13
Payer: COMMERCIAL